# Patient Record
Sex: MALE | Race: WHITE | NOT HISPANIC OR LATINO | Employment: OTHER | URBAN - METROPOLITAN AREA
[De-identification: names, ages, dates, MRNs, and addresses within clinical notes are randomized per-mention and may not be internally consistent; named-entity substitution may affect disease eponyms.]

---

## 2022-03-17 ENCOUNTER — APPOINTMENT (OUTPATIENT)
Dept: RADIOLOGY | Facility: MEDICAL CENTER | Age: 67
DRG: 268 | End: 2022-03-17
Attending: ANESTHESIOLOGY
Payer: MEDICARE

## 2022-03-17 ENCOUNTER — ANESTHESIA (OUTPATIENT)
Dept: SURGERY | Facility: MEDICAL CENTER | Age: 67
DRG: 268 | End: 2022-03-17
Payer: MEDICARE

## 2022-03-17 ENCOUNTER — HOSPITAL ENCOUNTER (INPATIENT)
Facility: MEDICAL CENTER | Age: 67
LOS: 2 days | DRG: 268 | End: 2022-03-19
Attending: EMERGENCY MEDICINE | Admitting: SURGERY
Payer: MEDICARE

## 2022-03-17 ENCOUNTER — HOSPITAL ENCOUNTER (OUTPATIENT)
Dept: RADIOLOGY | Facility: MEDICAL CENTER | Age: 67
End: 2022-03-17

## 2022-03-17 ENCOUNTER — APPOINTMENT (OUTPATIENT)
Dept: RADIOLOGY | Facility: MEDICAL CENTER | Age: 67
DRG: 268 | End: 2022-03-17
Attending: SURGERY
Payer: MEDICARE

## 2022-03-17 ENCOUNTER — ANESTHESIA EVENT (OUTPATIENT)
Dept: SURGERY | Facility: MEDICAL CENTER | Age: 67
DRG: 268 | End: 2022-03-17
Payer: MEDICARE

## 2022-03-17 DIAGNOSIS — D64.89 ANEMIA DUE TO MULTIPLE MECHANISMS: ICD-10-CM

## 2022-03-17 DIAGNOSIS — I71.8 RUPTURED AORTIC ANEURYSM, UNSPECIFIED PORTION OF AORTA (HCC): ICD-10-CM

## 2022-03-17 PROBLEM — I71.30 RUPTURED ABDOMINAL AORTIC ANEURYSM (AAA) (HCC): Status: ACTIVE | Noted: 2022-03-17

## 2022-03-17 LAB
ALBUMIN SERPL BCP-MCNC: 3 G/DL (ref 3.2–4.9)
ALBUMIN/GLOB SERPL: 1.1 G/DL
ALP SERPL-CCNC: 79 U/L (ref 30–99)
ALT SERPL-CCNC: 6 U/L (ref 2–50)
ANION GAP SERPL CALC-SCNC: 11 MMOL/L (ref 7–16)
ANION GAP SERPL CALC-SCNC: 13 MMOL/L (ref 7–16)
AST SERPL-CCNC: 8 U/L (ref 12–45)
BASOPHILS # BLD AUTO: 0.2 % (ref 0–1.8)
BASOPHILS # BLD: 0.04 K/UL (ref 0–0.12)
BILIRUB SERPL-MCNC: 0.7 MG/DL (ref 0.1–1.5)
BUN SERPL-MCNC: 15 MG/DL (ref 8–22)
BUN SERPL-MCNC: 15 MG/DL (ref 8–22)
CALCIUM SERPL-MCNC: 7.5 MG/DL (ref 8.5–10.5)
CALCIUM SERPL-MCNC: 7.9 MG/DL (ref 8.5–10.5)
CHLORIDE SERPL-SCNC: 106 MMOL/L (ref 96–112)
CHLORIDE SERPL-SCNC: 109 MMOL/L (ref 96–112)
CK SERPL-CCNC: 61 U/L (ref 0–154)
CO2 SERPL-SCNC: 19 MMOL/L (ref 20–33)
CO2 SERPL-SCNC: 20 MMOL/L (ref 20–33)
CREAT SERPL-MCNC: 0.95 MG/DL (ref 0.5–1.4)
CREAT SERPL-MCNC: 1.09 MG/DL (ref 0.5–1.4)
EOSINOPHIL # BLD AUTO: 0 K/UL (ref 0–0.51)
EOSINOPHIL NFR BLD: 0 % (ref 0–6.9)
ERYTHROCYTE [DISTWIDTH] IN BLOOD BY AUTOMATED COUNT: 53.3 FL (ref 35.9–50)
GFR SERPLBLD CREATININE-BSD FMLA CKD-EPI: 74 ML/MIN/1.73 M 2
GFR SERPLBLD CREATININE-BSD FMLA CKD-EPI: 87 ML/MIN/1.73 M 2
GLOBULIN SER CALC-MCNC: 2.8 G/DL (ref 1.9–3.5)
GLUCOSE SERPL-MCNC: 114 MG/DL (ref 65–99)
GLUCOSE SERPL-MCNC: 138 MG/DL (ref 65–99)
HCT VFR BLD AUTO: 41 % (ref 42–52)
HGB BLD-MCNC: 13.5 G/DL (ref 14–18)
IMM GRANULOCYTES # BLD AUTO: 0.15 K/UL (ref 0–0.11)
IMM GRANULOCYTES NFR BLD AUTO: 0.8 % (ref 0–0.9)
INR PPP: 1.09 (ref 0.87–1.13)
LACTATE BLD-SCNC: 0.9 MMOL/L (ref 0.5–2)
LACTATE BLD-SCNC: 2.4 MMOL/L (ref 0.5–2)
LYMPHOCYTES # BLD AUTO: 0.97 K/UL (ref 1–4.8)
LYMPHOCYTES NFR BLD: 5.4 % (ref 22–41)
MAGNESIUM SERPL-MCNC: 3.2 MG/DL (ref 1.5–2.5)
MCH RBC QN AUTO: 34.3 PG (ref 27–33)
MCHC RBC AUTO-ENTMCNC: 32.9 G/DL (ref 33.7–35.3)
MCV RBC AUTO: 104.1 FL (ref 81.4–97.8)
MONOCYTES # BLD AUTO: 1.69 K/UL (ref 0–0.85)
MONOCYTES NFR BLD AUTO: 9.5 % (ref 0–13.4)
NEUTROPHILS # BLD AUTO: 15.01 K/UL (ref 1.82–7.42)
NEUTROPHILS NFR BLD: 84.1 % (ref 44–72)
NRBC # BLD AUTO: 0 K/UL
NRBC BLD-RTO: 0 /100 WBC
PHOSPHATE SERPL-MCNC: 3.8 MG/DL (ref 2.5–4.5)
PLATELET # BLD AUTO: 344 K/UL (ref 164–446)
PMV BLD AUTO: 8.8 FL (ref 9–12.9)
POTASSIUM SERPL-SCNC: 4.7 MMOL/L (ref 3.6–5.5)
POTASSIUM SERPL-SCNC: 6.2 MMOL/L (ref 3.6–5.5)
PROT SERPL-MCNC: 5.8 G/DL (ref 6–8.2)
PROTHROMBIN TIME: 13.8 SEC (ref 12–14.6)
RBC # BLD AUTO: 3.94 M/UL (ref 4.7–6.1)
SARS-COV+SARS-COV-2 AG RESP QL IA.RAPID: NOTDETECTED
SODIUM SERPL-SCNC: 137 MMOL/L (ref 135–145)
SODIUM SERPL-SCNC: 141 MMOL/L (ref 135–145)
SPECIMEN SOURCE: NORMAL
WBC # BLD AUTO: 17.9 K/UL (ref 4.8–10.8)

## 2022-03-17 PROCEDURE — 700117 HCHG RX CONTRAST REV CODE 255: Performed by: SURGERY

## 2022-03-17 PROCEDURE — 700105 HCHG RX REV CODE 258: Performed by: ANESTHESIOLOGY

## 2022-03-17 PROCEDURE — 500258 HCHG CATH, SIZING OMNI 5F 70CM: Performed by: SURGERY

## 2022-03-17 PROCEDURE — C1769 GUIDE WIRE: HCPCS | Performed by: SURGERY

## 2022-03-17 PROCEDURE — 82550 ASSAY OF CK (CPK): CPT

## 2022-03-17 PROCEDURE — 85610 PROTHROMBIN TIME: CPT

## 2022-03-17 PROCEDURE — 04V03DZ RESTRICTION OF ABDOMINAL AORTA WITH INTRALUMINAL DEVICE, PERCUTANEOUS APPROACH: ICD-10-PCS | Performed by: SURGERY

## 2022-03-17 PROCEDURE — 160042 HCHG SURGERY MINUTES - EA ADDL 1 MIN LEVEL 5: Performed by: SURGERY

## 2022-03-17 PROCEDURE — 700102 HCHG RX REV CODE 250 W/ 637 OVERRIDE(OP): Performed by: SURGERY

## 2022-03-17 PROCEDURE — A9270 NON-COVERED ITEM OR SERVICE: HCPCS | Performed by: ANESTHESIOLOGY

## 2022-03-17 PROCEDURE — 93005 ELECTROCARDIOGRAM TRACING: CPT | Performed by: SURGERY

## 2022-03-17 PROCEDURE — 80053 COMPREHEN METABOLIC PANEL: CPT

## 2022-03-17 PROCEDURE — A9270 NON-COVERED ITEM OR SERVICE: HCPCS | Performed by: SURGERY

## 2022-03-17 PROCEDURE — 700101 HCHG RX REV CODE 250: Performed by: ANESTHESIOLOGY

## 2022-03-17 PROCEDURE — 160048 HCHG OR STATISTICAL LEVEL 1-5: Performed by: SURGERY

## 2022-03-17 PROCEDURE — C1760 CLOSURE DEV, VASC: HCPCS | Performed by: SURGERY

## 2022-03-17 PROCEDURE — 160031 HCHG SURGERY MINUTES - 1ST 30 MINS LEVEL 5: Performed by: SURGERY

## 2022-03-17 PROCEDURE — 700101 HCHG RX REV CODE 250: Performed by: SURGERY

## 2022-03-17 PROCEDURE — 700111 HCHG RX REV CODE 636 W/ 250 OVERRIDE (IP): Performed by: SURGERY

## 2022-03-17 PROCEDURE — 110454 HCHG SHELL REV 250: Performed by: SURGERY

## 2022-03-17 PROCEDURE — 83605 ASSAY OF LACTIC ACID: CPT

## 2022-03-17 PROCEDURE — 502240 HCHG MISC OR SUPPLY RC 0272: Performed by: SURGERY

## 2022-03-17 PROCEDURE — 160035 HCHG PACU - 1ST 60 MINS PHASE I: Performed by: SURGERY

## 2022-03-17 PROCEDURE — C1894 INTRO/SHEATH, NON-LASER: HCPCS | Performed by: SURGERY

## 2022-03-17 PROCEDURE — 87426 SARSCOV CORONAVIRUS AG IA: CPT

## 2022-03-17 PROCEDURE — 71045 X-RAY EXAM CHEST 1 VIEW: CPT

## 2022-03-17 PROCEDURE — 700105 HCHG RX REV CODE 258: Performed by: SURGERY

## 2022-03-17 PROCEDURE — C1768 GRAFT, VASCULAR: HCPCS | Performed by: SURGERY

## 2022-03-17 PROCEDURE — 34702 EVASC RPR A-AO NDGFT RPT: CPT | Performed by: SURGERY

## 2022-03-17 PROCEDURE — 160036 HCHG PACU - EA ADDL 30 MINS PHASE I: Performed by: SURGERY

## 2022-03-17 PROCEDURE — 500229 HCHG CATH, GLIDE STR TAPER 5FR 100: Performed by: SURGERY

## 2022-03-17 PROCEDURE — 99291 CRITICAL CARE FIRST HOUR: CPT

## 2022-03-17 PROCEDURE — 700111 HCHG RX REV CODE 636 W/ 250 OVERRIDE (IP): Performed by: ANESTHESIOLOGY

## 2022-03-17 PROCEDURE — 110372 HCHG SHELL REV 278: Performed by: SURGERY

## 2022-03-17 PROCEDURE — 80048 BASIC METABOLIC PNL TOTAL CA: CPT

## 2022-03-17 PROCEDURE — 84100 ASSAY OF PHOSPHORUS: CPT

## 2022-03-17 PROCEDURE — 75625 CONTRAST EXAM ABDOMINL AORTA: CPT | Mod: 26,59 | Performed by: SURGERY

## 2022-03-17 PROCEDURE — 83735 ASSAY OF MAGNESIUM: CPT

## 2022-03-17 PROCEDURE — 85025 COMPLETE CBC W/AUTO DIFF WBC: CPT

## 2022-03-17 PROCEDURE — 36247 INS CATH ABD/L-EXT ART 3RD: CPT | Mod: 50 | Performed by: SURGERY

## 2022-03-17 PROCEDURE — 500257: Performed by: SURGERY

## 2022-03-17 PROCEDURE — 770022 HCHG ROOM/CARE - ICU (200)

## 2022-03-17 PROCEDURE — 700102 HCHG RX REV CODE 250 W/ 637 OVERRIDE(OP): Performed by: ANESTHESIOLOGY

## 2022-03-17 PROCEDURE — 501837 HCHG SUTURE CV: Performed by: SURGERY

## 2022-03-17 PROCEDURE — 36415 COLL VENOUS BLD VENIPUNCTURE: CPT

## 2022-03-17 PROCEDURE — 99221 1ST HOSP IP/OBS SF/LOW 40: CPT | Mod: 25 | Performed by: SURGERY

## 2022-03-17 PROCEDURE — 501838 HCHG SUTURE GENERAL: Performed by: SURGERY

## 2022-03-17 PROCEDURE — C1725 CATH, TRANSLUMIN NON-LASER: HCPCS | Performed by: SURGERY

## 2022-03-17 PROCEDURE — 160002 HCHG RECOVERY MINUTES (STAT): Performed by: SURGERY

## 2022-03-17 PROCEDURE — 160009 HCHG ANES TIME/MIN: Performed by: SURGERY

## 2022-03-17 DEVICE — ENDOPROSTHESIS CONTRALATERAL LEG 14.5MM X 10CM: Type: IMPLANTABLE DEVICE | Site: GROIN | Status: FUNCTIONAL

## 2022-03-17 DEVICE — ENDOPROSTHESIS CONTRALATERAL LEG 14.5MM X 12CM: Type: IMPLANTABLE DEVICE | Site: GROIN | Status: FUNCTIONAL

## 2022-03-17 DEVICE — IMPLANTABLE DEVICE: Type: IMPLANTABLE DEVICE | Site: GROIN | Status: FUNCTIONAL

## 2022-03-17 RX ORDER — ACETAMINOPHEN 325 MG/1
650 TABLET ORAL 4 TIMES DAILY
Status: DISCONTINUED | OUTPATIENT
Start: 2022-03-17 | End: 2022-03-17

## 2022-03-17 RX ORDER — HYDROMORPHONE HYDROCHLORIDE 1 MG/ML
0.2 INJECTION, SOLUTION INTRAMUSCULAR; INTRAVENOUS; SUBCUTANEOUS
Status: DISCONTINUED | OUTPATIENT
Start: 2022-03-17 | End: 2022-03-17 | Stop reason: HOSPADM

## 2022-03-17 RX ORDER — KETOROLAC TROMETHAMINE 30 MG/ML
15 INJECTION, SOLUTION INTRAMUSCULAR; INTRAVENOUS EVERY 6 HOURS
Status: DISCONTINUED | OUTPATIENT
Start: 2022-03-17 | End: 2022-03-19

## 2022-03-17 RX ORDER — DEXAMETHASONE SODIUM PHOSPHATE 4 MG/ML
INJECTION, SOLUTION INTRA-ARTICULAR; INTRALESIONAL; INTRAMUSCULAR; INTRAVENOUS; SOFT TISSUE PRN
Status: DISCONTINUED | OUTPATIENT
Start: 2022-03-17 | End: 2022-03-17 | Stop reason: SURG

## 2022-03-17 RX ORDER — DIPHENHYDRAMINE HYDROCHLORIDE 50 MG/ML
12.5 INJECTION INTRAMUSCULAR; INTRAVENOUS
Status: DISCONTINUED | OUTPATIENT
Start: 2022-03-17 | End: 2022-03-17 | Stop reason: HOSPADM

## 2022-03-17 RX ORDER — POLYETHYLENE GLYCOL 3350 17 G/17G
1 POWDER, FOR SOLUTION ORAL 2 TIMES DAILY
Status: DISCONTINUED | OUTPATIENT
Start: 2022-03-18 | End: 2022-03-19 | Stop reason: HOSPADM

## 2022-03-17 RX ORDER — AMOXICILLIN 250 MG
1 CAPSULE ORAL
Status: DISCONTINUED | OUTPATIENT
Start: 2022-03-17 | End: 2022-03-19 | Stop reason: HOSPADM

## 2022-03-17 RX ORDER — NEOSTIGMINE METHYLSULFATE 1 MG/ML
INJECTION, SOLUTION INTRAVENOUS PRN
Status: DISCONTINUED | OUTPATIENT
Start: 2022-03-17 | End: 2022-03-17 | Stop reason: SURG

## 2022-03-17 RX ORDER — ACETAMINOPHEN 500 MG
1000 TABLET ORAL EVERY 4 HOURS PRN
Status: DISCONTINUED | OUTPATIENT
Start: 2022-03-17 | End: 2022-03-17 | Stop reason: HOSPADM

## 2022-03-17 RX ORDER — SODIUM CHLORIDE 9 MG/ML
INJECTION, SOLUTION INTRAVENOUS CONTINUOUS
Status: DISCONTINUED | OUTPATIENT
Start: 2022-03-17 | End: 2022-03-19

## 2022-03-17 RX ORDER — ONDANSETRON 2 MG/ML
4 INJECTION INTRAMUSCULAR; INTRAVENOUS EVERY 4 HOURS PRN
Status: DISCONTINUED | OUTPATIENT
Start: 2022-03-17 | End: 2022-03-17

## 2022-03-17 RX ORDER — HALOPERIDOL 5 MG/ML
1 INJECTION INTRAMUSCULAR EVERY 6 HOURS PRN
Status: DISCONTINUED | OUTPATIENT
Start: 2022-03-17 | End: 2022-03-19 | Stop reason: HOSPADM

## 2022-03-17 RX ORDER — ONDANSETRON 2 MG/ML
4 INJECTION INTRAMUSCULAR; INTRAVENOUS
Status: DISCONTINUED | OUTPATIENT
Start: 2022-03-17 | End: 2022-03-17 | Stop reason: HOSPADM

## 2022-03-17 RX ORDER — SODIUM CHLORIDE, SODIUM LACTATE, POTASSIUM CHLORIDE, CALCIUM CHLORIDE 600; 310; 30; 20 MG/100ML; MG/100ML; MG/100ML; MG/100ML
INJECTION, SOLUTION INTRAVENOUS
Status: DISCONTINUED | OUTPATIENT
Start: 2022-03-17 | End: 2022-03-17 | Stop reason: SURG

## 2022-03-17 RX ORDER — OXYCODONE HYDROCHLORIDE 5 MG/1
10 TABLET ORAL EVERY 4 HOURS PRN
Status: DISCONTINUED | OUTPATIENT
Start: 2022-03-17 | End: 2022-03-17

## 2022-03-17 RX ORDER — OXYCODONE HYDROCHLORIDE 5 MG/1
5 TABLET ORAL
Status: DISCONTINUED | OUTPATIENT
Start: 2022-03-17 | End: 2022-03-19 | Stop reason: HOSPADM

## 2022-03-17 RX ORDER — IBUPROFEN 800 MG/1
800 TABLET ORAL 3 TIMES DAILY PRN
Status: DISCONTINUED | OUTPATIENT
Start: 2022-03-20 | End: 2022-03-19

## 2022-03-17 RX ORDER — OXYCODONE HYDROCHLORIDE 5 MG/1
5 TABLET ORAL EVERY 4 HOURS PRN
Status: DISCONTINUED | OUTPATIENT
Start: 2022-03-17 | End: 2022-03-17

## 2022-03-17 RX ORDER — ACETAMINOPHEN 325 MG/1
650 TABLET ORAL EVERY 6 HOURS PRN
Status: DISCONTINUED | OUTPATIENT
Start: 2022-03-22 | End: 2022-03-19 | Stop reason: HOSPADM

## 2022-03-17 RX ORDER — ONDANSETRON 2 MG/ML
INJECTION INTRAMUSCULAR; INTRAVENOUS PRN
Status: DISCONTINUED | OUTPATIENT
Start: 2022-03-17 | End: 2022-03-17 | Stop reason: SURG

## 2022-03-17 RX ORDER — IODIXANOL 270 MG/ML
INJECTION, SOLUTION INTRAVASCULAR
Status: DISCONTINUED | OUTPATIENT
Start: 2022-03-17 | End: 2022-03-17 | Stop reason: HOSPADM

## 2022-03-17 RX ORDER — HYDROMORPHONE HYDROCHLORIDE 1 MG/ML
0.1 INJECTION, SOLUTION INTRAMUSCULAR; INTRAVENOUS; SUBCUTANEOUS
Status: DISCONTINUED | OUTPATIENT
Start: 2022-03-17 | End: 2022-03-17 | Stop reason: HOSPADM

## 2022-03-17 RX ORDER — HYDRALAZINE HYDROCHLORIDE 20 MG/ML
10 INJECTION INTRAMUSCULAR; INTRAVENOUS
Status: DISCONTINUED | OUTPATIENT
Start: 2022-03-17 | End: 2022-03-19 | Stop reason: HOSPADM

## 2022-03-17 RX ORDER — DEXAMETHASONE SODIUM PHOSPHATE 4 MG/ML
4 INJECTION, SOLUTION INTRA-ARTICULAR; INTRALESIONAL; INTRAMUSCULAR; INTRAVENOUS; SOFT TISSUE
Status: DISCONTINUED | OUTPATIENT
Start: 2022-03-17 | End: 2022-03-19 | Stop reason: HOSPADM

## 2022-03-17 RX ORDER — OXYCODONE HCL 5 MG/5 ML
10 SOLUTION, ORAL ORAL
Status: DISCONTINUED | OUTPATIENT
Start: 2022-03-17 | End: 2022-03-17 | Stop reason: HOSPADM

## 2022-03-17 RX ORDER — MEPERIDINE HYDROCHLORIDE 25 MG/ML
12.5 INJECTION INTRAMUSCULAR; INTRAVENOUS; SUBCUTANEOUS
Status: DISCONTINUED | OUTPATIENT
Start: 2022-03-17 | End: 2022-03-17 | Stop reason: HOSPADM

## 2022-03-17 RX ORDER — GLYCOPYRROLATE 0.2 MG/ML
INJECTION INTRAMUSCULAR; INTRAVENOUS PRN
Status: DISCONTINUED | OUTPATIENT
Start: 2022-03-17 | End: 2022-03-17 | Stop reason: SURG

## 2022-03-17 RX ORDER — ROCURONIUM BROMIDE 10 MG/ML
INJECTION, SOLUTION INTRAVENOUS PRN
Status: DISCONTINUED | OUTPATIENT
Start: 2022-03-17 | End: 2022-03-17 | Stop reason: SURG

## 2022-03-17 RX ORDER — LABETALOL HYDROCHLORIDE 5 MG/ML
10 INJECTION, SOLUTION INTRAVENOUS
Status: DISCONTINUED | OUTPATIENT
Start: 2022-03-17 | End: 2022-03-19 | Stop reason: HOSPADM

## 2022-03-17 RX ORDER — HEPARIN SODIUM,PORCINE 1000/ML
VIAL (ML) INJECTION PRN
Status: DISCONTINUED | OUTPATIENT
Start: 2022-03-17 | End: 2022-03-17 | Stop reason: SURG

## 2022-03-17 RX ORDER — HALOPERIDOL 5 MG/ML
1 INJECTION INTRAMUSCULAR
Status: DISCONTINUED | OUTPATIENT
Start: 2022-03-17 | End: 2022-03-17 | Stop reason: HOSPADM

## 2022-03-17 RX ORDER — BISACODYL 10 MG
10 SUPPOSITORY, RECTAL RECTAL
Status: DISCONTINUED | OUTPATIENT
Start: 2022-03-17 | End: 2022-03-19 | Stop reason: HOSPADM

## 2022-03-17 RX ORDER — SCOLOPAMINE TRANSDERMAL SYSTEM 1 MG/1
1 PATCH, EXTENDED RELEASE TRANSDERMAL
Status: DISCONTINUED | OUTPATIENT
Start: 2022-03-17 | End: 2022-03-19 | Stop reason: HOSPADM

## 2022-03-17 RX ORDER — ACETAMINOPHEN 325 MG/1
650 TABLET ORAL EVERY 6 HOURS
Status: DISCONTINUED | OUTPATIENT
Start: 2022-03-17 | End: 2022-03-19 | Stop reason: HOSPADM

## 2022-03-17 RX ORDER — SODIUM CHLORIDE, SODIUM LACTATE, POTASSIUM CHLORIDE, CALCIUM CHLORIDE 600; 310; 30; 20 MG/100ML; MG/100ML; MG/100ML; MG/100ML
INJECTION, SOLUTION INTRAVENOUS CONTINUOUS
Status: DISCONTINUED | OUTPATIENT
Start: 2022-03-17 | End: 2022-03-17 | Stop reason: HOSPADM

## 2022-03-17 RX ORDER — MAGNESIUM SULFATE HEPTAHYDRATE 40 MG/ML
INJECTION, SOLUTION INTRAVENOUS PRN
Status: DISCONTINUED | OUTPATIENT
Start: 2022-03-17 | End: 2022-03-17 | Stop reason: SURG

## 2022-03-17 RX ORDER — AMOXICILLIN 250 MG
1 CAPSULE ORAL NIGHTLY
Status: DISCONTINUED | OUTPATIENT
Start: 2022-03-17 | End: 2022-03-19 | Stop reason: HOSPADM

## 2022-03-17 RX ORDER — DOCUSATE SODIUM 100 MG/1
100 CAPSULE, LIQUID FILLED ORAL 2 TIMES DAILY
Status: DISCONTINUED | OUTPATIENT
Start: 2022-03-17 | End: 2022-03-19 | Stop reason: HOSPADM

## 2022-03-17 RX ORDER — DIPHENHYDRAMINE HYDROCHLORIDE 50 MG/ML
25 INJECTION INTRAMUSCULAR; INTRAVENOUS EVERY 6 HOURS PRN
Status: DISCONTINUED | OUTPATIENT
Start: 2022-03-17 | End: 2022-03-19 | Stop reason: HOSPADM

## 2022-03-17 RX ORDER — DEXTROSE MONOHYDRATE, SODIUM CHLORIDE, AND POTASSIUM CHLORIDE 50; 1.49; 4.5 G/1000ML; G/1000ML; G/1000ML
INJECTION, SOLUTION INTRAVENOUS CONTINUOUS
Status: ACTIVE | OUTPATIENT
Start: 2022-03-17 | End: 2022-03-17

## 2022-03-17 RX ORDER — PHENYLEPHRINE HCL IN 0.9% NACL 0.5 MG/5ML
SYRINGE (ML) INTRAVENOUS PRN
Status: DISCONTINUED | OUTPATIENT
Start: 2022-03-17 | End: 2022-03-17 | Stop reason: SURG

## 2022-03-17 RX ORDER — ONDANSETRON 2 MG/ML
4 INJECTION INTRAMUSCULAR; INTRAVENOUS EVERY 4 HOURS PRN
Status: DISCONTINUED | OUTPATIENT
Start: 2022-03-17 | End: 2022-03-19 | Stop reason: HOSPADM

## 2022-03-17 RX ORDER — ENEMA 19; 7 G/133ML; G/133ML
1 ENEMA RECTAL
Status: DISCONTINUED | OUTPATIENT
Start: 2022-03-17 | End: 2022-03-19 | Stop reason: HOSPADM

## 2022-03-17 RX ORDER — MIDAZOLAM HYDROCHLORIDE 1 MG/ML
1 INJECTION INTRAMUSCULAR; INTRAVENOUS
Status: DISCONTINUED | OUTPATIENT
Start: 2022-03-17 | End: 2022-03-17 | Stop reason: HOSPADM

## 2022-03-17 RX ORDER — HYDROMORPHONE HYDROCHLORIDE 1 MG/ML
0.4 INJECTION, SOLUTION INTRAMUSCULAR; INTRAVENOUS; SUBCUTANEOUS
Status: DISCONTINUED | OUTPATIENT
Start: 2022-03-17 | End: 2022-03-17 | Stop reason: HOSPADM

## 2022-03-17 RX ORDER — HEPARIN SODIUM 5000 [USP'U]/ML
5000 INJECTION, SOLUTION INTRAVENOUS; SUBCUTANEOUS EVERY 8 HOURS
Status: DISCONTINUED | OUTPATIENT
Start: 2022-03-17 | End: 2022-03-17

## 2022-03-17 RX ORDER — CEFAZOLIN SODIUM 1 G/3ML
INJECTION, POWDER, FOR SOLUTION INTRAMUSCULAR; INTRAVENOUS PRN
Status: DISCONTINUED | OUTPATIENT
Start: 2022-03-17 | End: 2022-03-17 | Stop reason: SURG

## 2022-03-17 RX ORDER — NICOTINE 21 MG/24HR
21 PATCH, TRANSDERMAL 24 HOURS TRANSDERMAL
Status: DISCONTINUED | OUTPATIENT
Start: 2022-03-17 | End: 2022-03-19 | Stop reason: HOSPADM

## 2022-03-17 RX ORDER — PROTAMINE SULFATE 10 MG/ML
INJECTION, SOLUTION INTRAVENOUS PRN
Status: DISCONTINUED | OUTPATIENT
Start: 2022-03-17 | End: 2022-03-17 | Stop reason: SURG

## 2022-03-17 RX ORDER — OXYCODONE HCL 5 MG/5 ML
5 SOLUTION, ORAL ORAL
Status: DISCONTINUED | OUTPATIENT
Start: 2022-03-17 | End: 2022-03-17 | Stop reason: HOSPADM

## 2022-03-17 RX ORDER — ONDANSETRON 4 MG/1
4 TABLET, ORALLY DISINTEGRATING ORAL EVERY 4 HOURS PRN
Status: DISCONTINUED | OUTPATIENT
Start: 2022-03-17 | End: 2022-03-19 | Stop reason: HOSPADM

## 2022-03-17 RX ORDER — OXYCODONE HYDROCHLORIDE 10 MG/1
10 TABLET ORAL
Status: DISCONTINUED | OUTPATIENT
Start: 2022-03-17 | End: 2022-03-19 | Stop reason: HOSPADM

## 2022-03-17 RX ADMIN — Medication 200 MCG: at 13:10

## 2022-03-17 RX ADMIN — MAGNESIUM SULFATE HEPTAHYDRATE 4 G: 40 INJECTION, SOLUTION INTRAVENOUS at 13:23

## 2022-03-17 RX ADMIN — Medication 200 MCG: at 13:27

## 2022-03-17 RX ADMIN — PROTAMINE SULFATE 50 MG: 10 INJECTION, SOLUTION INTRAVENOUS at 14:07

## 2022-03-17 RX ADMIN — Medication 200 MCG: at 12:57

## 2022-03-17 RX ADMIN — NICOTINE TRANSDERMAL SYSTEM 21 MG: 21 PATCH, EXTENDED RELEASE TRANSDERMAL at 17:26

## 2022-03-17 RX ADMIN — FENTANYL CITRATE 500 MCG: 50 INJECTION, SOLUTION INTRAMUSCULAR; INTRAVENOUS at 12:35

## 2022-03-17 RX ADMIN — HEPARIN SODIUM 6000 UNITS: 1000 INJECTION, SOLUTION INTRAVENOUS; SUBCUTANEOUS at 13:11

## 2022-03-17 RX ADMIN — DEXAMETHASONE SODIUM PHOSPHATE 4 MG: 4 INJECTION, SOLUTION INTRA-ARTICULAR; INTRALESIONAL; INTRAMUSCULAR; INTRAVENOUS; SOFT TISSUE at 13:13

## 2022-03-17 RX ADMIN — ROCURONIUM BROMIDE 50 MG: 10 INJECTION, SOLUTION INTRAVENOUS at 12:35

## 2022-03-17 RX ADMIN — ACETAMINOPHEN 650 MG: 325 TABLET, FILM COATED ORAL at 23:42

## 2022-03-17 RX ADMIN — HEPARIN SODIUM 2000 UNITS: 1000 INJECTION, SOLUTION INTRAVENOUS; SUBCUTANEOUS at 13:30

## 2022-03-17 RX ADMIN — NEOSTIGMINE METHYLSULFATE 5 MG: 1 INJECTION INTRAVENOUS at 14:05

## 2022-03-17 RX ADMIN — PROPOFOL 50 MG: 10 INJECTION, EMULSION INTRAVENOUS at 12:37

## 2022-03-17 RX ADMIN — KETOROLAC TROMETHAMINE 15 MG: 30 INJECTION, SOLUTION INTRAMUSCULAR; INTRAVENOUS at 17:25

## 2022-03-17 RX ADMIN — SODIUM CHLORIDE: 9 INJECTION, SOLUTION INTRAVENOUS at 17:26

## 2022-03-17 RX ADMIN — GLYCOPYRROLATE 1 MG: 0.2 INJECTION INTRAMUSCULAR; INTRAVENOUS at 14:05

## 2022-03-17 RX ADMIN — KETOROLAC TROMETHAMINE 15 MG: 30 INJECTION, SOLUTION INTRAMUSCULAR; INTRAVENOUS at 23:42

## 2022-03-17 RX ADMIN — SODIUM CHLORIDE, POTASSIUM CHLORIDE, SODIUM LACTATE AND CALCIUM CHLORIDE: 600; 310; 30; 20 INJECTION, SOLUTION INTRAVENOUS at 12:30

## 2022-03-17 RX ADMIN — CEFAZOLIN 2 G: 330 INJECTION, POWDER, FOR SOLUTION INTRAMUSCULAR; INTRAVENOUS at 12:50

## 2022-03-17 RX ADMIN — ONDANSETRON 4 MG: 2 INJECTION INTRAMUSCULAR; INTRAVENOUS at 13:13

## 2022-03-17 RX ADMIN — ACETAMINOPHEN 650 MG: 325 TABLET, FILM COATED ORAL at 17:25

## 2022-03-17 RX ADMIN — SUGAMMADEX 200 MG: 100 INJECTION, SOLUTION INTRAVENOUS at 14:21

## 2022-03-17 RX ADMIN — PROPOFOL 50 MG: 10 INJECTION, EMULSION INTRAVENOUS at 12:35

## 2022-03-17 RX ADMIN — DOCUSATE SODIUM 100 MG: 100 CAPSULE, LIQUID FILLED ORAL at 17:25

## 2022-03-17 ASSESSMENT — LIFESTYLE VARIABLES
AVERAGE NUMBER OF DAYS PER WEEK YOU HAVE A DRINK CONTAINING ALCOHOL: 7
EVER HAD A DRINK FIRST THING IN THE MORNING TO STEADY YOUR NERVES TO GET RID OF A HANGOVER: YES
HAVE YOU EVER FELT YOU SHOULD CUT DOWN ON YOUR DRINKING: YES
TOTAL SCORE: 4
HOW MANY TIMES IN THE PAST YEAR HAVE YOU HAD 5 OR MORE DRINKS IN A DAY: 365
TOTAL SCORE: 4
HAVE PEOPLE ANNOYED YOU BY CRITICIZING YOUR DRINKING: YES
DOES PATIENT WANT TO STOP DRINKING: NO
ON A TYPICAL DAY WHEN YOU DRINK ALCOHOL HOW MANY DRINKS DO YOU HAVE: 10
ALCOHOL_USE: YES
EVER FELT BAD OR GUILTY ABOUT YOUR DRINKING: YES
TOTAL SCORE: 4
CONSUMPTION TOTAL: POSITIVE

## 2022-03-17 ASSESSMENT — COGNITIVE AND FUNCTIONAL STATUS - GENERAL
MOBILITY SCORE: 24
SUGGESTED CMS G CODE MODIFIER MOBILITY: CH
SUGGESTED CMS G CODE MODIFIER DAILY ACTIVITY: CH
DAILY ACTIVITIY SCORE: 24

## 2022-03-17 ASSESSMENT — PATIENT HEALTH QUESTIONNAIRE - PHQ9
SUM OF ALL RESPONSES TO PHQ9 QUESTIONS 1 AND 2: 0
2. FEELING DOWN, DEPRESSED, IRRITABLE, OR HOPELESS: NOT AT ALL
1. LITTLE INTEREST OR PLEASURE IN DOING THINGS: NOT AT ALL

## 2022-03-17 ASSESSMENT — FIBROSIS 4 INDEX: FIB4 SCORE: 0.65

## 2022-03-17 ASSESSMENT — PAIN DESCRIPTION - PAIN TYPE: TYPE: ACUTE PAIN;SURGICAL PAIN

## 2022-03-17 ASSESSMENT — PAIN SCALES - GENERAL: PAIN_LEVEL: 0

## 2022-03-17 NOTE — OR NURSING
Resting comfortably in PACU.  Oriented times 4.  MAEW.HOB elevated  10 degrees.  Both groins clean and dry, no bleeding, no hematoma.  Toes warm and mobile with brisk cap refill.  PT pulses both feet by Doppler, Right DP by Doppler, Left DP no found by Doppler,  Erin Sheets aware.,  Patient denies pain and nausea.  PCXR done to confirm line placement and to rule out pneumothorax.  RIJ Triple lumen in place.  Left radial arterial line recalibrated, compares w cuff.   Left hand warm and mobile, nailbeds pink with brisk cap refill. Dr. Sanders here to talk to patient.  Stat labs drawn from A-line and sent to lab.  Report to SICU.

## 2022-03-17 NOTE — OP REPORT
ACUTE CARE VASCULAR SERVICE  OPERATIVE NOTE          Patient: Ray Myers  MRN:  [id number]  :  1955    Date:  3/17/2022     Preoperative Diagnosis:  - ruptured 7 cm infrarenal AAA with contained retroperitoneal hematoma     Postoperative Diagnosis:  - ruptured 7 cm infrarenal AAA with contained retroperitoneal hematoma      Procedure:  - Endovascular repair of ruptured infrarenal abdominal aortic aneurysm with [place] Excluder endograft with 2 docking limbs.  - Nonselective catheter placement in the aorta  - Aortoiliac angiogram  -Percutaneous access of the bilateral common femoral arteries with ultrasound guidance  -Bilateral femoral artery closure with pre-close technique using perclose devices (2 devices on the right, 1 on the left)     -------------------------------------------------------------------------------------------------     Surgeon:                                 Andrew Manley MD     Assistant:                                none     Anesthesia:                             GETA     IVF:                                          Per anesthesia record     UOP:                                       Samara kwan     EBL:                                        Minimal     Blood Products:                      none     Heparin:                                  Systemically heparinized       Devices used:                         Main trunk -35 mm (ipsilateral: right)                                                  Ipsilateral iliac - 16mm x 14.5 mm x 12cm iliac limb                                                   Contralateral iliac - 16mm x 14.5 mm x 10cm iliac limb         Complications:                        none     Disposition:                             Extubated and sent to PACU in stable condition        -----------------------------------------------------------------------------------------------------     History:  66 y.o. male with a ruputred infrarenal abdominal aortic aneurysm.  Endovascular repair was recommended.  I explained the details of the operation and potential risks, including but not limited to bleeding, infection, injury to vessels or nerves, possible open incision, use of xray and contrast exposure, risks of anesthesia, and global risks such as stroke, heart attack, and potentially not surviving the operation or recovery.  All questions were answered.     Procedure Summary:  Patient was placed supine on the OR table and GETA was administered. Abdomen and groins were prepped and draped in the usual fashion. Timeout was called to identify the correct patient, procedure, and equipment, and everyone was in agreement. Patient did receive preoperative prophylactic antibiotics.     The femoral arteries were accessed percutaneously and dilated up to 6 [place] Islander sheaths. We deployed 2 Perclose ProStyle devices in the right femoral artery and one in the left femoral artery and then we inserted a 18 [place] Taylor dry seal sheath in the right femoral artery and a 12 [place] Taylor dry seal sheath in the left common femoral artery. Patient was systemically heparinized.     The main trunk was preselected based on CT scan measurements and it was advanced up the ipsilateral side and an Omni Flush catheter was advanced up the contralateral side to the level of the renal arteries. Contrast angiography was performed to identify the renal origins and the graft was deployed just below.  The graft opened in perfect position relative to the renal arteries and post-deployment angiography revealed patent renal orifices.      The gate was deployed and cannulated with a glidewire and an Omni Flush catheter was passed up to the main trunk and formed and spun to verify that we had good confirmatory gate cannulation. We placed a Lunderquist wire up the contralateral side and retrograde angiography was performed to identify the hypogastric and an iliac limb was passed and deployed, landing perfectly just above the  hypogastric.      Deployment of the trunk was completed. Retrograde angiography was performed to identify the ipsilateral hypogastric.  Additional seal zone in the common iliac artery was necessary.  We passed an iliac limb and and deployed it in the common iliac landing just above the hypogastric.     Reliant balloons were passed up both sides and inflated at the proximal and distal seal zones and at overlap junctions.  Omniflush was advanced into the visceral aorta and completion angiogram revealed patent limbs with no evidence of endoleak.    The wires and sheaths were removed and the arteriotomies were closed with the Perclose sutures.  Feet remained well perfused.  The puncture sites were closed with skin glue.     All counts were correct. Patient was extubated and sent to PACU in stable condition.     Andrew Manley MD  [facility]  [phone]

## 2022-03-17 NOTE — ANESTHESIA PREPROCEDURE EVALUATION
Case: [id number] Date/Time: 03/17/22 1204    Procedure: EXCISION OR REPAIR, AAA OPEN    Location: [facility] OR 05 / SURGERY [facility]    Surgeons: Andrew Manley M.D.          Relevant Problems   No relevant active problems       Physical Exam    Airway   Mallampati: II  TM distance: >3 FB  Neck ROM: full       Cardiovascular - normal exam  Rhythm: regular  Rate: normal  (-) murmur     Dental - normal exam           Pulmonary - normal exam  Breath sounds clear to auscultation     Abdominal    Neurological - normal exam                 Anesthesia Plan    ASA 3- EMERGENT       Plan - general       Airway plan will be ETT          Induction: intravenous    Postoperative Plan: Postoperative administration of opioids is intended.    Pertinent diagnostic labs and testing reviewed    Informed Consent:    Anesthetic plan and risks discussed with patient.    Use of blood products discussed with: patient whom consented to blood products.       aaa; pamela cvp

## 2022-03-17 NOTE — ED PROVIDER NOTES
"ED Provider Note    ER PROVIDER NOTE        CHIEF COMPLAINT  Chief Complaint   Patient presents with   • Abdominal Aortic Aneurysm       HPI  Ray Myers is a 66 y.o. male who presents to the emergency department complaining of back pain as well as abdominal pain.  Patient reports gradual onset of his pain 3 days ago, and has been constant since.  Seems to be worse with movement but no known other alleviating or aggravating factors.  He reports no injury to the area or abnormal twisting or bending.  He reports no nausea or vomiting or diarrhea.  He reports no fevers or chills or cough or congestion, no urinary symptoms.    He was seen at [facility], had CT performed with potential leaking aneurysm  He was then transferred here.    REVIEW OF SYSTEMS  Pertinent positives include back pain, abdominal pain. Pertinent negatives include no fevers or vomiting. See HPI for details. All other systems reviewed and are negative.    PAST MEDICAL HISTORY       SURGICAL HISTORY  patient denies any surgical history    FAMILY HISTORY  History reviewed. No pertinent family history.    SOCIAL HISTORY  Social History     Tobacco Use   • Smoking status: Current Some Day Smoker   • Smokeless tobacco: Never Used   Substance and Sexual Activity   • Alcohol use: Yes   • Drug use: Yes      Social History     Substance and Sexual Activity   Drug Use Yes       CURRENT MEDICATIONS  Home Medications     Reviewed by Patrica Olivera R.N. (Registered Nurse) on 03/17/22 at 1138  Med List Status: Complete   Medication Last Dose Status        Patient Jose Taking any Medications                       ALLERGIES  No Known Allergies    PHYSICAL EXAM  VITAL SIGNS: /81   Pulse (!) 104   Temp 36.1 °C (97 °F) (Temporal)   Resp 16   Ht 1.803 m (5' 11\")   Wt 63.5 kg (140 lb)   SpO2 100%   BMI 19.53 kg/m²   Pulse ox interpretation: I interpret this pulse ox as normal.    Constitutional: Alert in no apparent distress.  HENT: No signs of trauma, " Bilateral external ears normal, Nose normal.   Eyes: Pupils are equal and reactive, Conjunctiva normal, Non-icteric.   Neck: Normal range of motion, No tenderness, Supple, No stridor.   Lymphatic: No lymphadenopathy noted.   Cardiovascular: Tachycardic, regular no murmurs.   Thorax & Lungs: Normal breath sounds, No respiratory distress, No wheezing, No chest tenderness.   Abdomen: Bowel sounds normal, Soft, No tenderness, No masses, No pulsatile masses. No peritoneal signs.  Skin: Warm, Dry, No erythema, No rash.   Back: No bony tenderness, No CVA tenderness.   Extremities: Intact distal pulses, No edema, No tenderness, No cyanosis,   Musculoskeletal: Good range of motion in all major joints. No tenderness to palpation or major deformities noted.   Neurologic: Alert , Normal motor function, Normal sensory function, No focal deficits noted.   Psychiatric: Affect normal, Judgment normal, Mood normal.     DIAGNOSTIC STUDIES / PROCEDURES    Labs Reviewed   LACTIC ACID - Abnormal; Notable for the following components:       Result Value    Lactic Acid 2.4 (*)     All other components within normal limits   CBC WITH DIFFERENTIAL - Abnormal; Notable for the following components:    WBC 17.9 (*)     RBC 3.94 (*)     Hemoglobin 13.5 (*)     Hematocrit 41.0 (*)     .1 (*)     MCH 34.3 (*)     MCHC 32.9 (*)     RDW 53.3 (*)     MPV 8.8 (*)     Neutrophils-Polys 84.10 (*)     Lymphocytes 5.40 (*)     Neutrophils (Absolute) 15.01 (*)     Lymphs (Absolute) 0.97 (*)     Monos (Absolute) 1.69 (*)     Immature Granulocytes (abs) 0.15 (*)     All other components within normal limits    Narrative:     Indicate which anticoagulants the patient is on:->NONE   COMP METABOLIC PANEL - Abnormal; Notable for the following components:    Potassium 6.2 (*)     Co2 19 (*)     Glucose 114 (*)     Calcium 7.9 (*)     AST(SGOT) 8 (*)     Albumin 3.0 (*)     Total Protein 5.8 (*)     All other components within normal limits    Narrative:      Indicate which anticoagulants the patient is on:->NONE   CREATINE KINASE    Narrative:     Indicate which anticoagulants the patient is on:->NONE   PROTHROMBIN TIME    Narrative:     Indicate which anticoagulants the patient is on:->NONE   SARS-COV ANTIGEN ENRIQUE   ESTIMATED GFR    Narrative:     Indicate which anticoagulants the patient is on:->NONE         RADIOLOGY  OUTSIDE IMAGES-DX CHEST   Final Result      DX-PORTABLE FLUORO > 1 HOUR    (Results Pending)     The radiologist's interpretation of all radiological studies have been reviewed and images independently viewed by me.    COURSE & MEDICAL DECISION MAKING  Nursing notes, VS, PMSFHx reviewed in chart.    11:42 AM Patient seen and examined at bedside.  Dr. Manley from vascular has seen the patient ready,labs, COVID19 testing.    In review of records, at [facility], patient was given 2 L NS, Zosyn, fentanyl, he is diagnostics showed pH 7.2, potassium 2.99, creatinine 1.4, lactate , WBC 22 otherwise unremarkable labs  CT of the abdomen pelvis showed fusiform aneurysmal dilation of the infrarenal abdominal aorta at 7.4 x 6.2 cm with intramural thrombus and potential hemorrhage although no rocio rupture, also potential for left pelvic phlegmon but no abscess    1203  Patient taken to the operating room for definitive care      Decision Making:  This is a 66 y.o. male presenting as transfer from outside facility with back and abdominal pain, had CT performed with abdominal aortic aneurysm with thrombus and hemorrhage.  Patient seen expeditiously by vascular surgery, Dr. Manley is taken to the operating room for further care    Patient is admitted to the operating room in critical condition    FINAL IMPRESSION  1. Ruptured aortic aneurysm, unspecified portion of aorta (HCC)         The note accurately reflects work and decisions made by me.  Carlos Turcios M.D.  3/17/2022  1:00 PM

## 2022-03-17 NOTE — CONSULTS
ACUTE CARE VASCULAR SERVICE  CONSULT NOTE      Date: 3/17/2022    Referring Provider: Carlos Turcios M.d.    Consulting Physician: Andrew Manley M.D. [facility]    -------------------------------------------------------------------------------------------------    Reason for consultation:  Ruptured AAA    HPI:  This is a 66 y.o. male who presented to outside hospital with 3-day history of waxing and waning back and abdominal pain.  Most recent episode of pain was severe enough that he decided to go to the ER in [place] for evaluation and CT scan there showed evidence of either a retroperitoneal hematoma or abscess, and also a 7 cm infrarenal abdominal aortic aneurysm.  Patient was transferred to [facility] for emergency evaluation.  Review of the CT scan shows that the retroperitoneal fluid is dense consistent with hematoma and a ruptured aneurysm is the most likely explanation for the patient's symptoms.  Fortunately the patient is alert and conversant and has been hemodynamically stable throughout the transfer.  Arrangements have been made to take the patient emergently to the operating room for endograft repair of his aneurysm.  His pain is currently 6 out of 10.  No pain or numbness in the lower extremities.  Patient does drink a pint of hard alcohol a day    History reviewed. No pertinent past medical history.    History reviewed. No pertinent surgical history.    No current facility-administered medications for this encounter.     No current outpatient medications on file.       Social History     Socioeconomic History   • Marital status: Not on file     Spouse name: Not on file   • Number of children: Not on file   • Years of education: Not on file   • Highest education level: Not on file   Occupational History   • Not on file   Tobacco Use   • Smoking status: Current Some Day Smoker   • Smokeless tobacco: Never Used   Substance and Sexual Activity   • Alcohol use: Yes   • Drug use: Yes   •  "Sexual activity: Not on file   Other Topics Concern   • Not on file   Social History Narrative   • Not on file     Social Determinants of Health     Financial Resource Strain: Not on file   Food Insecurity: Not on file   Transportation Needs: Not on file   Physical Activity: Not on file   Stress: Not on file   Social Connections: Not on file   Intimate Partner Violence: Not on file   Housing Stability: Not on file       History reviewed. No pertinent family history.    Allergies:  Patient has no known allergies.    Review of Systems:  Constitutional: Negative for fever, chills, weight loss,   HENT:   Negative for hearing loss or tinnitus    Eyes:    Negative for blurred vision, double vision, or loss of vision  Respiratory:  Negative for cough, hemoptysis, or wheezing    Cardiac:  Negative for chest pain or palpitations or orthopnea  Vascular:  Negative for claudication or rest pain   Gastrointestinal: Negative for nausea, vomiting, or abdominal pain     Negative for hematochezia or melena   Genitourinary: Negative for dysuria, frequency, or hematuria   Musculoskeletal: Negative for myalgias, back pain, or joint pain except as per HPI  Skin:   Negative for itching or rash  Neurological:  Negative for dizziness, headaches, or tremors     Negative for speech disturbance     Negative for extremity weakness or paresthesias  Endo/Heme:  Negative for easy bruising or bleeding  Psychiatric:  Negative for depression, suicidal ideas, or hallucinations    Physical Exam:  /82   Pulse (!) 101   Temp 36.6 °C (97.8 °F) (Temporal)   Resp 17   Ht 1.803 m (5' 11\")   Wt 63.5 kg (140 lb)   SpO2 98%     Constitutional: Alert, oriented, mild distress from back pain  HEENT:  Normocephalic and atraumatic, EOMI  Neck:   Supple, no JVD,   Cardiovascular: Regular rate and rhythm,   Pulmonary:  Good air entry bilaterally,    Abdominal:  Soft, non-tender, non-distended  Musculoskeletal: No edema, no tenderness  Neurological:  CN " II-XII grossly intact, no focal deficits  Skin:   Skin is warm and dry. No rash noted.  Psychiatric:  Normal mood and affect.  Vascular:  Extremities warm and well perfused    Radiology:  CT scan: evidence of either a retroperitoneal hematoma or abscess, and also a 7 cm infrarenal abdominal aortic aneurysm, retroperitoneal fluid is dense consistent with hematoma and a ruptured aneurysm is the most likely explanation    Assessment/Plan:  -Ruptured infrarenal abdominal aortic aneurysm    OR emergently for endograft repair    Andrew Manley MD  [facility]  Voalte preferred. Otherwise text to cell [phone] or call my office [phone]  __________________________________________________________________  Patient:Ray Myers   MRN:[id number]   CSN:[id number]

## 2022-03-17 NOTE — PROGRESS NOTES
ACUTE CARE VASCULAR SERVICE  PROGRESS NOTE      Ruptured AAA  Currently HDS  OR emergently for endovascular repair    Andrew Manley MD  [facility]  Voalte preferred. Otherwise text to cell [phone] or call my office [phone]  __________________________________________________________________  Patient:Ray Myers   MRN:[id number]   CSN:[id number]

## 2022-03-17 NOTE — ANESTHESIA PROCEDURE NOTES
Central Venous Line  Performed by: Vamshi Munson M.D.  Authorized by: Vamshi Munson M.D.     Start Time:  3/17/2022 12:40 PM  End Time:  3/17/2022 12:47 PM      provider hand hygiene performed prior to central venous catheter insertion, all 5 sterile barriers used (gloves, gown, cap, mask, large sterile drape) during central venous catheter insertion and skin prep agent completely dried prior to procedure    Patient Position:  Trendelenburg  Laterality:  Right  Site:  Internal jugular  Prep:  Chlorhexidine  Catheter Size:  7 Fr  Catheter Length (cm):  20  Number of Lumens:  Triple lumen  target vein identified, needle advanced into vein and blood aspirated and guidewire advanced into vein    Seldinger Technique?: Yes    Ultrasound-Guided: ultrasound-guided  Image captured, interpreted and electronically stored.  Sterile Gel and Probe Cover Used for Ultrasound?: Yes    Intravenous Verification: verified by ultrasound, venous blood return and chest x-ray pending    all ports aspirated, all ports flushed easily, guidewire was removed intact, biopatch was applied, line was sutured in place and dressing was applied    Events: patient tolerated procedure well with no complications    PA Catheter Placed?: No

## 2022-03-17 NOTE — ED TRIAGE NOTES
Chief Complaint   Patient presents with   • Abdominal Aortic Aneurysm     BIBA for above complaint. Per EMS patient had back pain for the last 2 days and abdominal pain today. Patient is A+Ox4. VSS. Patient was 80% on room air on arrival in outside ED. Placed on 3 liters. BP was in the 80's. Given 2 liters of NS. On arrival vss. Surgeon at bedside on arrival. Patient was found to have a 7 cm aaa that is leaking. Patient is stable on arrival.

## 2022-03-17 NOTE — ANESTHESIA PROCEDURE NOTES
Airway    Date/Time: 3/17/2022 12:37 PM  Performed by: Vamshi Munson M.D.  Authorized by: Vamshi Munson M.D.     Location:  OR  Urgency:  Elective  Indications for Airway Management:  Anesthesia      Spontaneous Ventilation: absent    Sedation Level:  Deep  Preoxygenated: Yes    Patient Position:  Sniffing  Mask Difficulty Assessment:  1 - vent by mask  Final Airway Type:  Endotracheal airway  Final Endotracheal Airway:  ETT  Cuffed: Yes    Technique Used for Successful ETT Placement:  Direct laryngoscopy    Insertion Site:  Oral  Blade Type:  Samara  Laryngoscope Blade/Videolaryngoscope Blade Size:  3  ETT Size (mm):  7.5  Measured from:  Teeth  ETT to Teeth (cm):  25  Placement Verified by: auscultation and capnometry    Cormack-Lehane Classification:  Grade IIa - partial view of glottis  Number of Attempts at Approach:  1

## 2022-03-17 NOTE — ANESTHESIA TIME REPORT
Anesthesia Start and Stop Event Times     Date Time Event    3/17/2022 1206 Ready for Procedure     1230 Anesthesia Start     1430 Anesthesia Stop        Responsible Staff  03/17/22    Name Role Begin End    Vamshi Munson M.D. Anesth 1230 1430        Preop Diagnosis (Free Text):  Pre-op Diagnosis     Abdominal Aortic Aneurysm        Preop Diagnosis (Codes):    Premium Reason  A. 3PM - 7AM    Comments:

## 2022-03-17 NOTE — ANESTHESIA POSTPROCEDURE EVALUATION
Patient: Ray Myers    Procedure Summary     Date: 03/17/22 Room / Location: [facility] 05 / SURGERY [facility]    Anesthesia Start: 1230 Anesthesia Stop: 1430    Procedure: EXCISION OR REPAIR, AAA ENDOVASCULAR (Groin) Diagnosis: (Abdominal Aortic Aneurysm)    Surgeons: Andrew Manley M.D. Responsible Provider: Vamshi Munson M.D.    Anesthesia Type: general ASA Status: 3 - Emergent          Final Anesthesia Type: general  Last vitals  BP   Blood Pressure : 139/81    Temp   36.1 °C (97 °F)    Pulse   (!) 104   Resp   16    SpO2   100 %      Anesthesia Post Evaluation    Patient location during evaluation: PACU  Patient participation: complete - patient participated  Level of consciousness: awake and alert  Pain score: 0    Airway patency: patent  Anesthetic complications: no  Cardiovascular status: hemodynamically stable  Respiratory status: acceptable  Hydration status: euvolemic    PONV: none          No complications documented.

## 2022-03-17 NOTE — ANESTHESIA PROCEDURE NOTES
Arterial Line  Performed by: Vamshi Munson M.D.  Authorized by: Vamshi Munson M.D.     Start Time:  3/17/2022 12:36 PM  End Time:  3/17/2022 12:37 PM  Localization: surface landmarks    Patient Location:  OR  Indication: continuous blood pressure monitoring        Catheter Size:  20 G  Seldinger Technique?: Yes    Laterality:  Right  Site:  Radial artery  Line Secured:  Antimicrobial disc, tape and transparent dressing  Events: patient tolerated procedure well with no complications

## 2022-03-18 PROBLEM — D64.89 ANEMIA DUE TO MULTIPLE MECHANISMS: Status: ACTIVE | Noted: 2022-03-18

## 2022-03-18 PROBLEM — F10.10 ALCOHOL ABUSE: Status: ACTIVE | Noted: 2022-03-18

## 2022-03-18 PROBLEM — Z78.9 NO CONTRAINDICATION TO DEEP VEIN THROMBOSIS (DVT) PROPHYLAXIS: Status: ACTIVE | Noted: 2022-03-18

## 2022-03-18 PROBLEM — Z72.0 TOBACCO ABUSE: Status: ACTIVE | Noted: 2022-03-18

## 2022-03-18 LAB
ANION GAP SERPL CALC-SCNC: 8 MMOL/L (ref 7–16)
BASOPHILS # BLD AUTO: 0.1 % (ref 0–1.8)
BASOPHILS # BLD: 0.02 K/UL (ref 0–0.12)
BUN SERPL-MCNC: 16 MG/DL (ref 8–22)
CALCIUM SERPL-MCNC: 7.2 MG/DL (ref 8.5–10.5)
CHLORIDE SERPL-SCNC: 105 MMOL/L (ref 96–112)
CO2 SERPL-SCNC: 22 MMOL/L (ref 20–33)
CREAT SERPL-MCNC: 0.93 MG/DL (ref 0.5–1.4)
EKG IMPRESSION: NORMAL
EOSINOPHIL # BLD AUTO: 0 K/UL (ref 0–0.51)
EOSINOPHIL NFR BLD: 0 % (ref 0–6.9)
ERYTHROCYTE [DISTWIDTH] IN BLOOD BY AUTOMATED COUNT: 53.9 FL (ref 35.9–50)
GFR SERPLBLD CREATININE-BSD FMLA CKD-EPI: 89 ML/MIN/1.73 M 2
GLUCOSE SERPL-MCNC: 113 MG/DL (ref 65–99)
HCT VFR BLD AUTO: 28 % (ref 42–52)
HGB BLD-MCNC: 9.1 G/DL (ref 14–18)
IMM GRANULOCYTES # BLD AUTO: 0.1 K/UL (ref 0–0.11)
IMM GRANULOCYTES NFR BLD AUTO: 0.7 % (ref 0–0.9)
LYMPHOCYTES # BLD AUTO: 1.36 K/UL (ref 1–4.8)
LYMPHOCYTES NFR BLD: 9.3 % (ref 22–41)
MCH RBC QN AUTO: 33.7 PG (ref 27–33)
MCHC RBC AUTO-ENTMCNC: 32.5 G/DL (ref 33.7–35.3)
MCV RBC AUTO: 103.7 FL (ref 81.4–97.8)
MONOCYTES # BLD AUTO: 1.44 K/UL (ref 0–0.85)
MONOCYTES NFR BLD AUTO: 9.9 % (ref 0–13.4)
NEUTROPHILS # BLD AUTO: 11.63 K/UL (ref 1.82–7.42)
NEUTROPHILS NFR BLD: 80 % (ref 44–72)
NRBC # BLD AUTO: 0 K/UL
NRBC BLD-RTO: 0 /100 WBC
PLATELET # BLD AUTO: 284 K/UL (ref 164–446)
PMV BLD AUTO: 9.2 FL (ref 9–12.9)
POTASSIUM SERPL-SCNC: 4.2 MMOL/L (ref 3.6–5.5)
RBC # BLD AUTO: 2.7 M/UL (ref 4.7–6.1)
SODIUM SERPL-SCNC: 135 MMOL/L (ref 135–145)
WBC # BLD AUTO: 14.6 K/UL (ref 4.8–10.8)

## 2022-03-18 PROCEDURE — A9270 NON-COVERED ITEM OR SERVICE: HCPCS | Performed by: SURGERY

## 2022-03-18 PROCEDURE — 93010 ELECTROCARDIOGRAM REPORT: CPT | Performed by: INTERNAL MEDICINE

## 2022-03-18 PROCEDURE — 700102 HCHG RX REV CODE 250 W/ 637 OVERRIDE(OP): Performed by: SURGERY

## 2022-03-18 PROCEDURE — 700111 HCHG RX REV CODE 636 W/ 250 OVERRIDE (IP): Performed by: SURGERY

## 2022-03-18 PROCEDURE — 99232 SBSQ HOSP IP/OBS MODERATE 35: CPT | Performed by: REGISTERED NURSE

## 2022-03-18 PROCEDURE — 80048 BASIC METABOLIC PNL TOTAL CA: CPT

## 2022-03-18 PROCEDURE — 700105 HCHG RX REV CODE 258: Performed by: SURGERY

## 2022-03-18 PROCEDURE — 700101 HCHG RX REV CODE 250: Performed by: SURGERY

## 2022-03-18 PROCEDURE — 85025 COMPLETE CBC W/AUTO DIFF WBC: CPT

## 2022-03-18 PROCEDURE — 770022 HCHG ROOM/CARE - ICU (200)

## 2022-03-18 RX ADMIN — KETOROLAC TROMETHAMINE 15 MG: 30 INJECTION, SOLUTION INTRAMUSCULAR; INTRAVENOUS at 11:14

## 2022-03-18 RX ADMIN — ACETAMINOPHEN 650 MG: 325 TABLET, FILM COATED ORAL at 17:17

## 2022-03-18 RX ADMIN — ACETAMINOPHEN 650 MG: 325 TABLET, FILM COATED ORAL at 11:14

## 2022-03-18 RX ADMIN — KETOROLAC TROMETHAMINE 15 MG: 30 INJECTION, SOLUTION INTRAMUSCULAR; INTRAVENOUS at 23:23

## 2022-03-18 RX ADMIN — KETOROLAC TROMETHAMINE 15 MG: 30 INJECTION, SOLUTION INTRAMUSCULAR; INTRAVENOUS at 05:25

## 2022-03-18 RX ADMIN — NICOTINE TRANSDERMAL SYSTEM 21 MG: 21 PATCH, EXTENDED RELEASE TRANSDERMAL at 05:34

## 2022-03-18 RX ADMIN — LABETALOL HYDROCHLORIDE 10 MG: 5 INJECTION INTRAVENOUS at 09:52

## 2022-03-18 RX ADMIN — SODIUM CHLORIDE: 9 INJECTION, SOLUTION INTRAVENOUS at 02:53

## 2022-03-18 RX ADMIN — KETOROLAC TROMETHAMINE 15 MG: 30 INJECTION, SOLUTION INTRAMUSCULAR; INTRAVENOUS at 17:16

## 2022-03-18 RX ADMIN — OXYCODONE 5 MG: 5 TABLET ORAL at 19:50

## 2022-03-18 RX ADMIN — ACETAMINOPHEN 650 MG: 325 TABLET, FILM COATED ORAL at 05:25

## 2022-03-18 RX ADMIN — ENOXAPARIN SODIUM 30 MG: 30 INJECTION SUBCUTANEOUS at 05:25

## 2022-03-18 RX ADMIN — ACETAMINOPHEN 650 MG: 325 TABLET, FILM COATED ORAL at 23:23

## 2022-03-18 ASSESSMENT — ENCOUNTER SYMPTOMS
DIZZINESS: 0
DIAPHORESIS: 0
MUSCULOSKELETAL NEGATIVE: 1
HEMATOLOGIC/LYMPHATIC NEGATIVE: 1
WEAKNESS: 0
NEUROLOGICAL NEGATIVE: 1
CHILLS: 0
ABDOMINAL PAIN: 0
HEADACHES: 0
PSYCHIATRIC NEGATIVE: 1
ALLERGIC/IMMUNOLOGIC NEGATIVE: 1
SEIZURES: 0

## 2022-03-18 ASSESSMENT — PAIN DESCRIPTION - PAIN TYPE
TYPE: ACUTE PAIN

## 2022-03-18 ASSESSMENT — FIBROSIS 4 INDEX: FIB4 SCORE: 0.65

## 2022-03-18 NOTE — PROGRESS NOTES
"  ACUTE CARE VASCULAR SERVICE  PROGRESS NOTE    Events  3/18/2022:  MEHNAZ, pain controlled, tolerating PO, no back/abdominal pain    Vitals  /61   Pulse 76   Temp 36.6 °C (97.9 °F) (Temporal)   Resp 19   Ht 1.803 m (5' 11\")   Wt 68.5 kg (151 lb)   SpO2 96%   BMI 21.06 kg/m²   UOP:         ~400 cc/12hrs    Exam  General: alert, conversant, NAD  Chest: good air entry  Heart: RRR  Abdomen: benign  Extremities: femoral puncture sites CDI, no hematoma, Palpable PT bilaterally    Labs  WBC 18 -> 14 today   Hgb 13 -> 9 today no e/o bleeding  Creatinine normal    A/P)  3/17/22: EVAR for rAAA    Doing well  Normal renal function  No e/o DTs at this time  No e/o bleeding  DC kwan and A-line  Activity and diet as tolerated    Monitor one more night, if OK in AM will clear for discharge      Andrew Manley MD  [facility]  Voalte preferred. Otherwise text to cell [phone] or call my office [phone]  __________________________________________________________________  Patient:Ray Myers   MRN:[id number]   CSN:[id number]    "

## 2022-03-18 NOTE — CARE PLAN
The patient is Stable - Low risk of patient condition declining or worsening    Shift Goals  Clinical Goals: pulse checks, stable hemodynamics  Patient Goals: rest, pain control  Family Goals: rest    Progress made toward(s) clinical / shift goals:        Problem: Knowledge Deficit - Standard  Goal: Patient and family/care givers will demonstrate understanding of plan of care, disease process/condition, diagnostic tests and medications  Outcome: Progressing  Note: Patient educated on plan and goals of care and disease process. Education provided on medications, procedures, and equipment. Will continue to re-inforce education when required. All questions and concerns answered at this time.       Problem: Pain - Standard  Goal: Alleviation of pain or a reduction in pain to the patient’s comfort goal  Outcome: Progressing  Note: Educated patient on the use of 1-10 pain scale and use of pain descriptors. Administered pain medication when needed per MAR. Non-pharmacological methods for pain control in place such as rest, repositioning, and enforcing a calm and conductive environment.

## 2022-03-18 NOTE — ASSESSMENT & PLAN NOTE
Malnutrition, bone marrow suppression from alcoholism, acute blood loss  Transfuse if hemoglobin <7  Trend serially

## 2022-03-18 NOTE — DISCHARGE PLANNING
Anticipated Discharge Disposition: likely home      Action: RN CM reviewed patient chart.  Therapy evals pending, six clicks 24.  No post acute referrals/order at this time.  No CM needs identified at this time.       Barriers to Discharge: medical clearance; therapy evals     Plan: HCM to remain available to assist with discharge planning needs and barriers

## 2022-03-18 NOTE — CARE PLAN
The patient is Stable - Low risk of patient condition declining or worsening    Shift Goals  Clinical Goals: pulse checks, stable hemodynamics  Patient Goals: rest, pain control  Family Goals: rest    Progress made toward(s) clinical / shift goals:      0952 BP>160 HR 65 gave prn labetolol    1030 oob to chair 1 person assist.     1200 pt tired, transferred back to bed.     1500 Kwan removed per Dr. Manley    1515 L A-line removed. Pressure held hemostasis achieved.    1630 250 ml urine out after kwan removal.    1700 walked around unit 1 lap SBA    POC discharge tomorrow     Problem: Knowledge Deficit - Standard  Goal: Patient and family/care givers will demonstrate understanding of plan of care, disease process/condition, diagnostic tests and medications  Outcome: Progressing     Problem: Pain - Standard  Goal: Alleviation of pain or a reduction in pain to the patient’s comfort goal  Outcome: Progressing     Problem: Skin Integrity  Goal: Skin integrity is maintained or improved  Outcome: Progressing       Patient is not progressing towards the following goals:

## 2022-03-18 NOTE — PROGRESS NOTES
12-hour chart check.    Monitor summary review:  Agree with calculated measurements   Prolonged QT

## 2022-03-18 NOTE — PROGRESS NOTES
Trauma / Surgical Daily Progress Note    Date of Service  3/18/2022    Chief Complaint  66 y.o. male admitted 3/17/2022 with a ruptured abdominal aortic aneurysm    POD # 1 EVAR with bilateral endarterectomies of the CFA    Interval Events  Doing well, denies pain  Hemodynamically stable  Tolerating diet    - Advance diet to regular  - CBC/BMP in AM  - Monitor for alcohol withdrawal, high risk    Review of Systems  Review of Systems   Constitutional: Negative for chills and diaphoresis.   HENT: Negative for congestion and ear discharge.    Eyes: Negative for visual disturbance.   Cardiovascular: Negative for chest pain.   Gastrointestinal: Negative for abdominal pain.   Genitourinary: Negative.    Musculoskeletal: Negative.    Allergic/Immunologic: Negative.    Neurological: Negative.  Negative for dizziness, seizures, weakness and headaches.   Hematological: Negative.    Psychiatric/Behavioral: Negative.    All other systems reviewed and are negative.       Vital Signs for last 24 hours  Temp:  [37.1 °C (98.8 °F)] 37.1 °C (98.8 °F)  Pulse:  [63-81] 75  Resp:  [16-43] 23  BP: (100-154)/(53-74) 126/60  SpO2:  [93 %-98 %] 97 %    Hemodynamic parameters for last 24 hours       Respiratory Data     Respiration: (!) 23, Pulse Oximetry: 97 %             Physical Exam  Physical Exam  Vitals and nursing note reviewed.   Constitutional:       General: He is not in acute distress.     Appearance: Normal appearance.   HENT:      Head: Normocephalic and atraumatic.      Nose: Nose normal.      Mouth/Throat:      Mouth: Mucous membranes are moist.      Pharynx: Oropharynx is clear.   Eyes:      Extraocular Movements: Extraocular movements intact.      Conjunctiva/sclera: Conjunctivae normal.      Pupils: Pupils are equal, round, and reactive to light.   Cardiovascular:      Rate and Rhythm: Normal rate and regular rhythm.      Pulses: Normal pulses.           Femoral pulses are 2+ on the right side and 2+ on the left side.        Dorsalis pedis pulses are 2+ on the right side and 2+ on the left side.      Heart sounds: Normal heart sounds. No murmur heard.  Pulmonary:      Effort: Pulmonary effort is normal. No respiratory distress.      Breath sounds: Normal breath sounds.   Abdominal:      General: Abdomen is flat. Bowel sounds are normal.      Palpations: Abdomen is soft.      Tenderness: There is no abdominal tenderness.   Musculoskeletal:         General: Normal range of motion.      Cervical back: Normal range of motion.      Right lower leg: No edema.      Left lower leg: No edema.   Skin:     General: Skin is warm and dry.      Capillary Refill: Capillary refill takes less than 2 seconds.      Comments: Bilateral groin incisions, well approximated with glue, no evidence of bleeding.    Neurological:      General: No focal deficit present.      Mental Status: He is alert and oriented to person, place, and time. Mental status is at baseline.   Psychiatric:         Mood and Affect: Mood normal.         Laboratory  Recent Results (from the past 24 hour(s))   EKG    Collection Time: 22  5:17 PM   Result Value Ref Range    Report       [facility]    Test Date:  2022  Pt Name:    JOSETTE PHILLIPS                 Department: [facility]  MRN:        [id number]                      Room:       [facility]  Gender:     Male                         Technician: [facility]  :        1955                   Requested By:JENNIFER NICHOLSON  Order #:    [id number]                    Reading MD: Jennifer Cee MD    Measurements  Intervals                                Axis  Rate:       71                           P:          67  GA:         148                          QRS:        -26  QRSD:       92                           T:          -31  QT:         472  QTc:        513    Interpretive Statements  SINUS RHYTHM  BORDERLINE LOW VOLTAGE IN FRONTAL LEADS  INFERIOR INFARCT, OLD  INFERIOR T WAVE ABNORMALITY, NON-SPECIFIC  PROLONGED QT  INTERVAL  Electronically Signed On 3- 6:44:45 PDT by Andrew Cee MD     CBC WITH DIFFERENTIAL    Collection Time: 03/18/22  4:27 AM   Result Value Ref Range    WBC 14.6 (H) 4.8 - 10.8 K/uL    RBC 2.70 (L) 4.70 - 6.10 M/uL    Hemoglobin 9.1 (L) 14.0 - 18.0 g/dL    Hematocrit 28.0 (L) 42.0 - 52.0 %    .7 (H) 81.4 - 97.8 fL    MCH 33.7 (H) 27.0 - 33.0 pg    MCHC 32.5 (L) 33.7 - 35.3 g/dL    RDW 53.9 (H) 35.9 - 50.0 fL    Platelet Count 284 164 - 446 K/uL    MPV 9.2 9.0 - 12.9 fL    Neutrophils-Polys 80.00 (H) 44.00 - 72.00 %    Lymphocytes 9.30 (L) 22.00 - 41.00 %    Monocytes 9.90 0.00 - 13.40 %    Eosinophils 0.00 0.00 - 6.90 %    Basophils 0.10 0.00 - 1.80 %    Immature Granulocytes 0.70 0.00 - 0.90 %    Nucleated RBC 0.00 /100 WBC    Neutrophils (Absolute) 11.63 (H) 1.82 - 7.42 K/uL    Lymphs (Absolute) 1.36 1.00 - 4.80 K/uL    Monos (Absolute) 1.44 (H) 0.00 - 0.85 K/uL    Eos (Absolute) 0.00 0.00 - 0.51 K/uL    Baso (Absolute) 0.02 0.00 - 0.12 K/uL    Immature Granulocytes (abs) 0.10 0.00 - 0.11 K/uL    NRBC (Absolute) 0.00 K/uL   Basic Metabolic Panel    Collection Time: 03/18/22  4:27 AM   Result Value Ref Range    Sodium 135 135 - 145 mmol/L    Potassium 4.2 3.6 - 5.5 mmol/L    Chloride 105 96 - 112 mmol/L    Co2 22 20 - 33 mmol/L    Glucose 113 (H) 65 - 99 mg/dL    Bun 16 8 - 22 mg/dL    Creatinine 0.93 0.50 - 1.40 mg/dL    Calcium 7.2 (L) 8.5 - 10.5 mg/dL    Anion Gap 8.0 7.0 - 16.0   ESTIMATED GFR    Collection Time: 03/18/22  4:27 AM   Result Value Ref Range    GFR (CKD-EPI) 89 >60 mL/min/1.73 m 2       Fluids    Intake/Output Summary (Last 24 hours) at 3/18/2022 1630  Last data filed at 3/18/2022 1400  Gross per 24 hour   Intake 1892.5 ml   Output 1060 ml   Net 832.5 ml       Core Measures & Quality Metrics  Core Measures & Quality Metrics  SCARLETT Score  ETOH Screening    Assessment/Plan  * Ruptured abdominal aortic aneurysm (AAA) (HCC)- (present on admission)  Assessment & Plan  From [facility]  [place] CT: Large infrarenal aneurysm, without free rupture  3/17 EVAR with bilateral endarterectomies of the CFA  Michael Manley MD - Vascular    Anemia due to multiple mechanisms- (present on admission)  Assessment & Plan  Malnutrition, bone marrow suppression from alcoholism, acute blood loss  Transfuse if hemoglobin <7  Trend serially    Tobacco abuse- (present on admission)  Assessment & Plan  At least 1ppd for many years  Nicotine replacement ordered on admission    Alcohol abuse- (present on admission)  Assessment & Plan  Drinks a pint of hard alcohol a day  No recent cessation  At risk for withdrawal    No contraindication to deep vein thrombosis (DVT) prophylaxis- (present on admission)  Assessment & Plan  Lovenox for chemical DVT prophylaxis started post-op      Discussed patient condition with RN, Patient and trauma surgery Dr. Carreon.

## 2022-03-18 NOTE — OR NURSING
1700  Patient unsure of his sister-in-law's phone number,  He told me that her name is Hailey Myers in [place]. I can't find any mention of family in the chart.  I attempted to reach Hailey at [phone] (online White Pages) but got a busy signal several times.  I spoke with Magda, ICU RN, patient will have ordered 12 lead EKG done shortly.

## 2022-03-18 NOTE — ASSESSMENT & PLAN NOTE
From [facility] CT: Large infrarenal aneurysm, without free rupture  3/17 EVAR with bilateral endarterectomies of the CFA  Michael Manley MD - Vascular

## 2022-03-19 ENCOUNTER — PHARMACY VISIT (OUTPATIENT)
Dept: PHARMACY | Facility: MEDICAL CENTER | Age: 67
End: 2022-03-19
Payer: COMMERCIAL

## 2022-03-19 VITALS
DIASTOLIC BLOOD PRESSURE: 65 MMHG | OXYGEN SATURATION: 96 % | WEIGHT: 151 LBS | HEART RATE: 93 BPM | RESPIRATION RATE: 22 BRPM | SYSTOLIC BLOOD PRESSURE: 139 MMHG | HEIGHT: 71 IN | BODY MASS INDEX: 21.14 KG/M2 | TEMPERATURE: 98.8 F

## 2022-03-19 PROBLEM — Z78.9 NO CONTRAINDICATION TO DEEP VEIN THROMBOSIS (DVT) PROPHYLAXIS: Status: RESOLVED | Noted: 2022-03-18 | Resolved: 2022-03-19

## 2022-03-19 LAB
ANION GAP SERPL CALC-SCNC: 8 MMOL/L (ref 7–16)
BASOPHILS # BLD AUTO: 0.2 % (ref 0–1.8)
BASOPHILS # BLD: 0.02 K/UL (ref 0–0.12)
BUN SERPL-MCNC: 14 MG/DL (ref 8–22)
CALCIUM SERPL-MCNC: 7.5 MG/DL (ref 8.5–10.5)
CHLORIDE SERPL-SCNC: 109 MMOL/L (ref 96–112)
CO2 SERPL-SCNC: 23 MMOL/L (ref 20–33)
CREAT SERPL-MCNC: 0.71 MG/DL (ref 0.5–1.4)
EOSINOPHIL # BLD AUTO: 0.06 K/UL (ref 0–0.51)
EOSINOPHIL NFR BLD: 0.7 % (ref 0–6.9)
ERYTHROCYTE [DISTWIDTH] IN BLOOD BY AUTOMATED COUNT: 55.6 FL (ref 35.9–50)
GFR SERPLBLD CREATININE-BSD FMLA CKD-EPI: 100 ML/MIN/1.73 M 2
GLUCOSE SERPL-MCNC: 98 MG/DL (ref 65–99)
HCT VFR BLD AUTO: 25.9 % (ref 42–52)
HGB BLD-MCNC: 8.3 G/DL (ref 14–18)
IMM GRANULOCYTES # BLD AUTO: 0.06 K/UL (ref 0–0.11)
IMM GRANULOCYTES NFR BLD AUTO: 0.7 % (ref 0–0.9)
LYMPHOCYTES # BLD AUTO: 1.78 K/UL (ref 1–4.8)
LYMPHOCYTES NFR BLD: 19.7 % (ref 22–41)
MCH RBC QN AUTO: 34 PG (ref 27–33)
MCHC RBC AUTO-ENTMCNC: 32 G/DL (ref 33.7–35.3)
MCV RBC AUTO: 106.1 FL (ref 81.4–97.8)
MONOCYTES # BLD AUTO: 0.68 K/UL (ref 0–0.85)
MONOCYTES NFR BLD AUTO: 7.5 % (ref 0–13.4)
NEUTROPHILS # BLD AUTO: 6.45 K/UL (ref 1.82–7.42)
NEUTROPHILS NFR BLD: 71.2 % (ref 44–72)
NRBC # BLD AUTO: 0 K/UL
NRBC BLD-RTO: 0 /100 WBC
PLATELET # BLD AUTO: 270 K/UL (ref 164–446)
PMV BLD AUTO: 9.1 FL (ref 9–12.9)
POTASSIUM SERPL-SCNC: 4 MMOL/L (ref 3.6–5.5)
RBC # BLD AUTO: 2.44 M/UL (ref 4.7–6.1)
SODIUM SERPL-SCNC: 140 MMOL/L (ref 135–145)
WBC # BLD AUTO: 9.1 K/UL (ref 4.8–10.8)

## 2022-03-19 PROCEDURE — 700102 HCHG RX REV CODE 250 W/ 637 OVERRIDE(OP): Performed by: SURGERY

## 2022-03-19 PROCEDURE — A9270 NON-COVERED ITEM OR SERVICE: HCPCS | Performed by: SURGERY

## 2022-03-19 PROCEDURE — 99239 HOSP IP/OBS DSCHRG MGMT >30: CPT | Performed by: REGISTERED NURSE

## 2022-03-19 PROCEDURE — 80048 BASIC METABOLIC PNL TOTAL CA: CPT

## 2022-03-19 PROCEDURE — RXMED WILLOW AMBULATORY MEDICATION CHARGE: Performed by: REGISTERED NURSE

## 2022-03-19 PROCEDURE — 85025 COMPLETE CBC W/AUTO DIFF WBC: CPT

## 2022-03-19 PROCEDURE — 700105 HCHG RX REV CODE 258: Performed by: SURGERY

## 2022-03-19 RX ORDER — FOLIC ACID 1 MG/1
1 TABLET ORAL DAILY
Qty: 30 TABLET | Refills: 12 | Status: SHIPPED | OUTPATIENT
Start: 2022-03-19 | End: 2022-07-27

## 2022-03-19 RX ORDER — FUROSEMIDE 40 MG/1
40 TABLET ORAL ONCE
Status: COMPLETED | OUTPATIENT
Start: 2022-03-19 | End: 2022-03-19

## 2022-03-19 RX ORDER — FOLIC ACID 1 MG/1
1 TABLET ORAL DAILY
Status: DISCONTINUED | OUTPATIENT
Start: 2022-03-19 | End: 2022-03-19 | Stop reason: HOSPADM

## 2022-03-19 RX ORDER — ACETAMINOPHEN 325 MG/1
650 TABLET ORAL EVERY 6 HOURS
Qty: 30 TABLET | Refills: 0 | COMMUNITY
Start: 2022-03-19 | End: 2022-07-27

## 2022-03-19 RX ORDER — GAUZE BANDAGE 2" X 2"
100 BANDAGE TOPICAL DAILY
Status: DISCONTINUED | OUTPATIENT
Start: 2022-03-19 | End: 2022-03-19 | Stop reason: HOSPADM

## 2022-03-19 RX ORDER — LANOLIN ALCOHOL/MO/W.PET/CERES
100 CREAM (GRAM) TOPICAL DAILY
Qty: 30 TABLET | Refills: 12 | Status: SHIPPED | OUTPATIENT
Start: 2022-03-19 | End: 2022-07-27

## 2022-03-19 RX ORDER — FOLIC ACID 1 MG/1
1 TABLET ORAL DAILY
Qty: 30 TABLET | Refills: 12 | Status: SHIPPED | OUTPATIENT
Start: 2022-03-19 | End: 2022-03-19 | Stop reason: SDUPTHER

## 2022-03-19 RX ADMIN — NICOTINE TRANSDERMAL SYSTEM 21 MG: 21 PATCH, EXTENDED RELEASE TRANSDERMAL at 05:45

## 2022-03-19 RX ADMIN — FOLIC ACID 1 MG: 1 TABLET ORAL at 10:18

## 2022-03-19 RX ADMIN — SODIUM CHLORIDE: 9 INJECTION, SOLUTION INTRAVENOUS at 05:52

## 2022-03-19 RX ADMIN — THIAMINE HCL TAB 100 MG 100 MG: 100 TAB at 10:19

## 2022-03-19 RX ADMIN — ACETAMINOPHEN 650 MG: 325 TABLET, FILM COATED ORAL at 05:46

## 2022-03-19 RX ADMIN — FUROSEMIDE 40 MG: 40 TABLET ORAL at 11:18

## 2022-03-19 RX ADMIN — ACETAMINOPHEN 650 MG: 325 TABLET, FILM COATED ORAL at 11:19

## 2022-03-19 ASSESSMENT — ENCOUNTER SYMPTOMS
CONSTITUTIONAL NEGATIVE: 1
PSYCHIATRIC NEGATIVE: 1
NEUROLOGICAL NEGATIVE: 1
CARDIOVASCULAR NEGATIVE: 1
EYES NEGATIVE: 1
RESPIRATORY NEGATIVE: 1
DIZZINESS: 0
BLURRED VISION: 0
GASTROINTESTINAL NEGATIVE: 1
MUSCULOSKELETAL NEGATIVE: 1
FOCAL WEAKNESS: 0
DOUBLE VISION: 0

## 2022-03-19 ASSESSMENT — PAIN DESCRIPTION - PAIN TYPE: TYPE: ACUTE PAIN

## 2022-03-19 NOTE — DISCHARGE SUMMARY
Discharge Summary    DATE OF ADMISSION: 3/17/2022    DATE OF DISCHARGE: 3/19/2022    DISCHARGE DIAGNOSIS:  ? Ruptured 7 cm infrarenal abdominal aortic aneurysm with contained retroperitoneal hematoma    CONSULTATIONS:  ? none    PROCEDURES:  Endovascular repair of ruptured infrarenal abdominal aortic aneurysm with [place] Excluder endograft with 2 docking limbs    BRIEF HPI and HOSPITAL COURSE:  ? Admitted with above, see admission history and physical. Underwent procedure as above. On day of discharge, the patient has stable vital signs, on room air, tolerating an oral diet, and pain is well controlled with PO meds. The patient is stable for discharge to home.    Review of Systems   Constitutional: Negative.    Eyes: Negative.  Negative for blurred vision and double vision.   Respiratory: Negative.    Cardiovascular: Negative.    Gastrointestinal: Negative.    Genitourinary: Negative.    Musculoskeletal: Negative.    Neurological: Negative.  Negative for dizziness and focal weakness.   Psychiatric/Behavioral: Negative.    All other systems reviewed and are negative.      Physical Exam  Vitals and nursing note reviewed.   Constitutional:       Appearance: Normal appearance.   HENT:      Head: Normocephalic and atraumatic.      Nose: Nose normal.      Mouth/Throat:      Mouth: Mucous membranes are moist.      Pharynx: Oropharynx is clear.   Eyes:      Pupils: Pupils are equal, round, and reactive to light.   Cardiovascular:      Rate and Rhythm: Normal rate.      Pulses: Normal pulses.           Femoral pulses are 2+ on the right side and 2+ on the left side.       Dorsalis pedis pulses are 2+ on the right side and 2+ on the left side.      Heart sounds: Normal heart sounds.   Pulmonary:      Effort: Pulmonary effort is normal.      Breath sounds: Normal breath sounds.   Abdominal:      General: Abdomen is flat. Bowel sounds are normal.      Palpations: Abdomen is soft.   Musculoskeletal:      Right lower leg: No  edema.      Left lower leg: No edema.   Skin:     General: Skin is warm and dry.      Comments: Bilateral groin incisions well approximated with glue. Clean, dry, and intact.   Neurological:      General: No focal deficit present.      Mental Status: He is alert.         DISPOSITION: Discharged home on 3/19/2022. The patient was and questions were answered. Specifically, signs and symptoms of infection, respiratory decompensation,  and persistent or worsening pain were discussed and the patient agrees to seek medical attention if any of these develop.    DISCHARGE MEDICATIONS:  The patients controlled substance history was reviewed and a controlled substance use informed consent (if applicable) was provided by [facility] and the patient has been prescribed.     Medication List      START taking these medications      Instructions   acetaminophen 325 MG Tabs  Commonly known as: Tylenol   Take 2 Tablets by mouth every 6 hours.  Dose: 650 mg     folic acid 1 MG Tabs  Commonly known as: FOLVITE   Take 1 Tablet by mouth every day.  Dose: 1 mg     thiamine 100 MG tablet  Commonly known as: THIAMINE   Take 1 Tablet by mouth every day.  Dose: 100 mg            ACTIVITY:  No strenuous exercise or heavy lifting (more than 10 lbs) until released in follow up.      WOUND CARE:  You may shower, but do not submerge in a bath for at least two weeks. If you have wound dressings, they may come off after 48 hours. If you have skin glue to the wound, this will fall off on its own, do not pick at it. If you have steri strips to the wound, these will fall off on their own, do not pick at them, may trim the edges if needed.      DIET:  Orders Placed This Encounter   Procedures   • Diet Order Diet: Regular     Standing Status:   Standing     Number of Occurrences:   1     Order Specific Question:   Diet:     Answer:   Regular [1]       FOLLOW UP:  Andrew Manley M.D.  [address]  [facility] [address]  [place]  [address]  [phone]    Schedule an appointment as soon as possible for a visit in 1 week        TIME SPENT ON DISCHARGE: 40 minutes      ____________________________________________  DONTA Stoner    DD: 3/19/2022 9:35 AM

## 2022-03-19 NOTE — DIETARY
Nutrition Services:  66 year old male admitted for large abdominal mass.  Per chart review patient with MST score of 2 related to weight loss and poor PO.  PO intake has been adequate since admited with > 50-75% of meals consumed. S/P surgery 3/17 for ruptured AAA. Chart reviewed.  No other nutrition triggers noted at this time and it appears that patient to likely D/C soon.     Plan/Recommend:  · RD will monitor per department policy while admitted and follow-up for nutrition interventions as needed.

## 2022-03-19 NOTE — CARE PLAN
The patient is Stable - Low risk of patient condition declining or worsening    Shift Goals  Clinical Goals: pulse checks, stable hemodynamics  Patient Goals: rest, pain control  Family Goals: rest    Progress made toward(s) clinical / shift goals:  Ambulation. Pain management. Neuro/vascular checks Q4hrs    Problem: Knowledge Deficit - Standard  Goal: Patient and family/care givers will demonstrate understanding of plan of care, disease process/condition, diagnostic tests and medications  Note: Updated patient/daughter on current POC. All questions/concerns addressed at this time.     Problem: Pain - Standard  Goal: Alleviation of pain or a reduction in pain to the patient’s comfort goal  Note: Pain assessed utilizing 0-10 Pain assessment tool. Pain medications given per MAR        Patient is not progressing towards the following goals:

## 2022-03-19 NOTE — DISCHARGE INSTRUCTIONS
Post Operative Discharge Instructions:    1. DIET: Upon discharge from the hospital you may resume your normal preoperative diet. Depending on how you are feeling and whether you have nausea or not, you may wish to stay with a bland diet for the first few days. However, you can advance this as quickly as you feel ready.    2. ACTIVITIES: After discharge from the hospital, you may resume full routine activities. However, there should be no heavy lifting (greater than 10 pounds) and no strenuous activities until after your follow-up visit. Otherwise, routine activities of daily living are acceptable.    3. DRIVING: You may drive whenever you are off pain medications and are able to perform the activities needed to drive, i.e. turning, bending, twisting, etc.    4. BATHING: You may get the wound wet at any time after leaving the hospital. You may shower, but do not submerge in a bath for at least two weeks. If you have wound dressings, they may come off after 48 hours. If you have skin glue to the wound, this will fall off on its own, do not pick at it. If you have steri strips to the wound, these will fall off on their own, do not pick at them, may trim the edges if needed.    5. BOWEL FUNCTION: A few patients, after this operation, will develop either frequent or loose stools after meals. This usually corrects itself after a few days, to a few weeks. If this occurs, do not worry; it is not unusual and will resolve. Much more common than loose stools, is constipation. The combination of pain medication and decreased activity level can cause constipation in otherwise normal patients. If you feel this is occurring, take a laxative (Milk of Magnesia, Ex-Lax, Senokot, etc.) until the problem has resolved.    6. PAIN MEDICATION: You will be given a prescription for pain medication at discharge. Please take these as directed. It is important to remember not to take medications on an empty stomach as this may cause  nausea. You may also take over the counter acetaminophen and/or NSAIDS (ibuprofen, Aleve, Advil, Motrin) per the package instructions.     7.CALL IF YOU HAVE: (1) Fevers to more than 101F, (2) Unusual chest or leg pain, (3) Drainage or fluid from incision that may be foul smelling, increased tenderness or soreness at the wound or the wound edges are no longer together, redness or swelling at the incision site. Please do not hesitate to call with any other questions.      Abdominal Aortic Aneurysm    An aneurysm is a bulge in one of the blood vessels that carry blood away from the heart (artery). It happens when blood pushes up against a weak or damaged place in the wall of an artery. An abdominal aortic aneurysm happens in the main artery of the body (aorta).  Some aneurysms may not cause problems. If it grows, it can burst or tear, causing bleeding inside the body. This is an emergency. It needs to be treated right away.  What are the causes?  The exact cause of this condition is not known.  What increases the risk?  The following may make you more likely to get this condition:  · Being a male who is 60 years of age or older.  · Being white ().  · Using tobacco.  · Having a family history of aneurysms.  · Having the following conditions:  ? Hardening of the arteries (arteriosclerosis).  ? Inflammation of the walls of an artery (arteritis).  ? Certain genetic conditions.  ? Being very overweight (obesity).  ? An infection in the wall of the aorta (infectious aortitis).  ? High cholesterol.  ? High blood pressure (hypertension).  What are the signs or symptoms?  Symptoms depend on the size of the aneurysm and how fast it is growing. Most grow slowly and do not cause any symptoms. If symptoms do occur, they may include:  · Pain in the belly (abdomen), side, or back.  · Feeling full after eating only small amounts of food.  · Feeling a throbbing lump in the belly.  Symptoms that the aneurysm has burst  (ruptured) include:  · Sudden, very bad pain in the belly, side, or back.  · Feeling sick to your stomach (nauseous).  · Throwing up (vomiting).  · Feeling light-headed or passing out.  How is this treated?  Treatment for this condition depends on:  · The size of the aneurysm.  · How fast it is growing.  · Your age.  · Your risk of having it burst.  If your aneurysm is smaller than 2 inches (5 cm), your doctor may manage it by:  · Checking it often to see if it is getting bigger. You may have an imaging test (ultrasound) to check it every 3-6 months, every year, or every few years.  · Giving you medicines to:  ? Control blood pressure.  ? Treat pain.  ? Fight infection.  If your aneurysm is larger than 2 inches (5 cm), you may need surgery to fix it.  Follow these instructions at home:  Lifestyle  · Do not use any products that have nicotine or tobacco in them. This includes cigarettes, e-cigarettes, and chewing tobacco. If you need help quitting, ask your doctor.  · Get regular exercise. Ask your doctor what types of exercise are best for you.  Eating and drinking  · Eat a heart-healthy diet. This includes eating plenty of:  ? Fresh fruits and vegetables.  ? Whole grains.  ? Low-fat (lean) protein.  ? Low-fat dairy products.  · Avoid foods that are high in saturated fat and cholesterol. These foods include red meat and some dairy products.  · Do not drink alcohol if:  ? Your doctor tells you not to drink.  ? You are pregnant, may be pregnant, or are planning to become pregnant.  · If you drink alcohol:  ? Limit how much you use to:  § 0-1 drink a day for women.  § 0-2 drinks a day for men.  ? Be aware of how much alcohol is in your drink. In the [place] one drink equals any of these:  § One typical bottle of beer (12 oz).  § One-half glass of wine (5 oz).  § One shot of hard liquor (1½ oz).  General instructions  · Take over-the-counter and prescription medicines only as told by your doctor.  · Keep your blood  pressure within normal limits. Ask your doctor what your blood pressure should be.  · Have your blood sugar (glucose) level and cholesterol levels checked regularly. Keep your blood sugar level and cholesterol levels within normal limits.  · Avoid heavy lifting and activities that take a lot of effort. Ask your doctor what activities are safe for you.  · Keep all follow-up visits as told by your doctor. This is important.  ? Talk to your doctor about regular screenings to see if the aneurysm is getting bigger.  Contact a doctor if you:  · Have pain in your belly, side, or back.  · Have a throbbing feeling in your belly.  · Have a family history of aneurysms.  Get help right away if you:  · Have sudden, bad pain in your belly, side, or back.  · Feel sick to your stomach.  · Throw up.  · Have trouble pooping (constipation).  · Have trouble peeing (urinating).  · Feel light-headed.  · Have a fast heart rate when you stand.  · Have sweaty skin that is cold to the touch (clammy).  · Have shortness of breath.  · Have a fever.  These symptoms may be an emergency. Do not wait to see if the symptoms will go away. Get medical help right away. Call your local emergency services (911 in the [place] Do not drive yourself to the hospital.  Summary  · An aneurysm is a bulge in one of the blood vessels that carry blood away from the heart (artery). Some aneurysms may not cause problems.  · You may need to have yours checked often. If it grows, it can burst or tear. This causes bleeding inside the body. It needs to be treated right away.  · Follow instructions from your doctor about healthy lifestyle changes.  · Keep all follow-up visits as told by your doctor. This is important.  This information is not intended to replace advice given to you by your health care provider. Make sure you discuss any questions you have with your health care provider.  Document Released: 04/14/2014 Document Revised: 04/06/2020 Document Reviewed:  07/27/2019  [employer] Patient Education © 2020 [employer]        Discharge Instructions    Discharged to home by car with friend. Discharged via wheelchair, hospital escort: Yes.  Special equipment needed: Not Applicable    Be sure to schedule a follow-up appointment with your primary care doctor or any specialists as instructed.     Discharge Plan:   Diet Plan: Discussed  Activity Level: Discussed  Confirmed Follow up Appointment: Patient to Call and Schedule Appointment  Confirmed Symptoms Management: Discussed  Medication Reconciliation Updated: Yes  Influenza Vaccine Indication: Patient Refuses    I understand that a diet low in cholesterol, fat, and sodium is recommended for good health. Unless I have been given specific instructions below for another diet, I accept this instruction as my diet prescription.   Other diet:      Special Instructions: None    · Is patient discharged on Warfarin / Coumadin?   No     Depression / Suicide Risk    As you are discharged from this [facility] facility, it is important to learn how to keep safe from harming yourself.    Recognize the warning signs:  · Abrupt changes in personality, positive or negative- including increase in energy   · Giving away possessions  · Change in eating patterns- significant weight changes-  positive or negative  · Change in sleeping patterns- unable to sleep or sleeping all the time   · Unwillingness or inability to communicate  · Depression  · Unusual sadness, discouragement and loneliness  · Talk of wanting to die  · Neglect of personal appearance   · Rebelliousness- reckless behavior  · Withdrawal from people/activities they love  · Confusion- inability to concentrate     If you or a loved one observes any of these behaviors or has concerns about self-harm, here's what you can do:  · Talk about it- your feelings and reasons for harming yourself  · Remove any means that you might use to hurt yourself (examples: pills, rope, extension  cords, firearm)  · Get professional help from the community (Mental Health, Substance Abuse, psychological counseling)  · Do not be alone:Call your Safe Contact- someone whom you trust who will be there for you.  · Call your local CRISIS HOTLINE 299-4029 or 700-015-0590  · Call your local Children's Mobile Crisis Response Team [place] (165) 307-9449 or www.[employer]  · Call the toll free National Suicide Prevention Hotlines   · National Suicide Prevention Lifeline 165-706-BKBW (0427)  · National Hope Line Network 800-SUICIDE (330-9454)

## 2022-03-19 NOTE — CARE PLAN
"  Problem: Knowledge Deficit - Standard  Goal: Patient and family/care givers will demonstrate understanding of plan of care, disease process/condition, diagnostic tests and medications  Outcome: Met     Problem: Pain - Standard  Goal: Alleviation of pain or a reduction in pain to the patient’s comfort goal  Outcome: Met     Problem: Skin Integrity  Goal: Skin integrity is maintained or improved  Outcome: Met   The patient is stable and ready for discharge.    Shift Goals  Clinical Goals: Stable neuro and hemodynamics  Patient Goals: \"To get me a beer\"  Family Goals: n/a    Progress made toward(s) clinical / shift goals:  yes, except \"getting the pt a beer.\"    Patient is not progressing towards the following goals:      "

## 2022-03-19 NOTE — PROGRESS NOTES
ACUTE CARE VASCULAR SERVICE  PROGRESS NOTE      Doing well  Home today  FU with me 2 weeks    Andrew Manley MD  [facility]  Voalte preferred. Otherwise text to cell [phone] or call my office [phone]  __________________________________________________________________  Patient:Ray Myers   MRN:[id number]   CSN:[id number]

## 2022-03-22 DIAGNOSIS — R19.07 GENERALIZED INTRA-ABDOMINAL AND PELVIC SWELLING, MASS AND LUMP: ICD-10-CM

## 2022-03-22 DIAGNOSIS — I71.30 RUPTURED ABDOMINAL AORTIC ANEURYSM (AAA) (HCC): ICD-10-CM

## 2022-04-14 ENCOUNTER — DOCUMENTATION (OUTPATIENT)
Dept: VASCULAR LAB | Facility: MEDICAL CENTER | Age: 67
End: 2022-04-14
Payer: MEDICARE

## 2022-04-14 NOTE — PROGRESS NOTES
"Spoke with patient and patients niece to remind the patient that he is due for his 1 mo scan post aneurysm repair. Also let them know we have a referral for medical management of his aneurysm repair. Patient states that he does not have any transportation to get to [place] for follow up, per Mary ARDON, patient ok to be seen virtually. patient and niece agree. Scheduled NP appt with Dr. Bloch on 7/27/22 for Facetime.     Patient and niece state that they were to have a post op appt with Dr. Manley virtually however were unsuccessful as they \"sat on hold for 2 hours and then hung up\". Let them know I would reach out to their office so they can get rescheduled.     Called [facility], they will reach out to [facility] to get this appt rescheduled.     Mailed out appt reminder card along with imaging order and an information pamphlet regarding abdominal aneurysm repairs.       "

## 2022-05-12 ENCOUNTER — HOSPITAL ENCOUNTER (OUTPATIENT)
Dept: RADIOLOGY | Facility: MEDICAL CENTER | Age: 67
End: 2022-05-12
Payer: MEDICARE

## 2022-05-13 ENCOUNTER — DOCUMENTATION (OUTPATIENT)
Dept: VASCULAR LAB | Facility: MEDICAL CENTER | Age: 67
End: 2022-05-13
Payer: MEDICARE

## 2022-05-13 NOTE — PROGRESS NOTES
Imaging reviewed in accordance with institution EVAR guideline  This imaging performed approx 2 mo after procedure    Review of CTA demonstrates stable endograft without endoleak.retroperitoneal hem improved.   There is a small unrepaired suprarenal AAA that is stable     Await initial visit in vascular medicine clinic as scheduled.     Anticipate repeat imaging at one year in accordance with institution guideline. Given complexity of presentation will opt for CTA at one year rather than u/s    Michael Bloch, MD  Vascular Care:

## 2022-07-27 ENCOUNTER — OFFICE VISIT (OUTPATIENT)
Dept: VASCULAR LAB | Facility: MEDICAL CENTER | Age: 67
End: 2022-07-27
Attending: INTERNAL MEDICINE
Payer: MEDICARE

## 2022-07-27 DIAGNOSIS — F17.200 SMOKING: ICD-10-CM

## 2022-07-27 DIAGNOSIS — I71.30 RUPTURED ABDOMINAL AORTIC ANEURYSM (AAA) (HCC): ICD-10-CM

## 2022-07-27 PROCEDURE — 99203 OFFICE O/P NEW LOW 30 MIN: CPT | Performed by: INTERNAL MEDICINE

## 2022-07-27 RX ORDER — ASPIRIN 81 MG/1
81 TABLET, CHEWABLE ORAL DAILY
Qty: 100 TABLET | Refills: 11 | Status: SHIPPED | OUTPATIENT
Start: 2022-07-27 | End: 2023-09-05

## 2022-07-27 NOTE — PROGRESS NOTES
VASCULAR MEDICINE CLINIC - INITIAL VISIT  07/27/22     Ray Myers is a 66 y.o. male who presents today  for vascular care    This visit was conducted via [employer] video call using secure and encrypted video conferencing technology to reduce spread of and/or potential exposures to COVID.  The patient was in a private location (home) in the state [facility].    The patient's identity was confirmed and verbal consent was obtained for this virtual visit.     Subjective      HPI:  Patient referred for evaluation and management of AAA.  Patient presented with ruptured abdominal aortic aneurysm in March 2022.  He was transferred to [facility].  He underwent emergent endovascular stent graft.  His postoperative course was uneventful.  He states has been doing well since he was discharged.  He had a CT scan as per below.  Denies any belly pain.  Denies claudication.  Denies back pain.  Denies any history of heart attack stroke or other vascular disease.  He says his blood pressures been a little bit high in the past but is never been on blood pressure medications.  He does have some sort of visiting nurse service that comes out to see him most days and they can measure his blood pressure.  He states that he is never been told his cholesterol was high.  Is never been on cholesterol-lowering medicine.  He denies diabetes.  He is not currently taking aspirin.  He continues to smoke a pack a day    Past medical history -abdominal aortic aneurysm    Past Surgical History:   Procedure Laterality Date   • ABDOMINAL AORTIC ANEURYSM  3/17/2022    Procedure: EXCISION OR REPAIR, AAA ENDOVASCULAR;  Surgeon: Andrew Manley M.D.;  Location: SURGERY [facility];  Service: Vascular        Family history-noncontributory    Social History     Tobacco Use   • Smoking status: Current Some Day Smoker   • Smokeless tobacco: Never Used   Substance Use Topics   • Alcohol use: Yes   • Drug use: Yes        No current outpatient medications on file  prior to visit.     No current facility-administered medications on file prior to visit.        ALLERGIES  Patient has no known allergies.     DIET AND EXERCISE:  Weight Change: Stable  Diet: common adult  Exercise: sporadic irregular exercise             Objective     Objective:     There were no vitals filed for this visit.     Physical Exam  Constitutional:       General: He is not in acute distress.     Appearance: He is not diaphoretic.   HENT:      Head: Normocephalic and atraumatic.   Eyes:      General: No scleral icterus.     Conjunctiva/sclera: Conjunctivae normal.   Pulmonary:      Effort: Pulmonary effort is normal. No respiratory distress.   Skin:     Coloration: Skin is not pale.   Neurological:      Mental Status: He is alert and oriented to person, place, and time.      Cranial Nerves: No cranial nerve deficit.   Psychiatric:         Mood and Affect: Affect normal.         Judgment: Judgment normal.          DATA REVIEW    No results found for: CHOLSTRLTOT, LDL, HDL, TRIGLYCERIDE    Lab Results   Component Value Date/Time    SODIUM 140 03/19/2022 05:40 AM    POTASSIUM 4.0 03/19/2022 05:40 AM    CHLORIDE 109 03/19/2022 05:40 AM    CO2 23 03/19/2022 05:40 AM    GLUCOSE 98 03/19/2022 05:40 AM    BUN 14 03/19/2022 05:40 AM    CREATININE 0.71 03/19/2022 05:40 AM     Lab Results   Component Value Date/Time    ALKPHOSPHAT 79 03/17/2022 11:55 AM    ASTSGOT 8 (L) 03/17/2022 11:55 AM    ALTSGPT 6 03/17/2022 11:55 AM    TBILIRUBIN 0.7 03/17/2022 11:55 AM       No results found for: HBA1C    No results found for: MICROALBCALC, MALBCRT, MALBEXCR, VFCEBJ69, MICROALBUR, MICRALB, UMICROALBUM, MICROALBTIM      CTA abdomen pelvis May 2022 at [facility]  Status post endovascular stenting of large fusiform aneurysm infrarenal abdominal aorta aortic stent and iliac artery  Residual aneurysm sac 7 x 6 cm slightly decreased in size from previous  No evidence of endoleak or recurrent rupture  Near complete  resolution of hematoma  Stable aneurysm dilation suprarenal abdominal aorta 3.2 x 3 cm      Medical Decision Making:  Today's Assessment / Status / Plan:     1. Ruptured abdominal aortic aneurysm (AAA) (HCC)  aspirin (ASA) 81 MG Chew Tab chewable tablet   2. Smoking          Patient Type: Secondary Prevention    Etiology of Established CVD if Present:     1.  Abdominal aortic aneurysm with rupture status post EVAR March 2022 -no endoleak and EVAR appears stable on CTA 2 months after procedure.  His aneurysm sac was fairly large at presentation and there was residual suprarenal aneurysm.  We discussed the need for lifelong surveillance and the link between abdominal aortic aneurysm and risk of MI and stroke  -Repeat CTA at [facility] March 2023  -Medical management as per below    Lipid Management: Qualifies for Statin Therapy Based on 2018 ACC/AHA Guidelines: yes  Calculated 10-Year Risk of ASCVD: N/A  Currently on Statin: no  No lipid panel available  Discussed the fact that AAA is a form of ASCVD and indication for statin therapy.   Despite my recommendation patient declines statin therapy and declines to have a fasting lipid panel drawn  Will reevaluate at follow-up visit    Blood Pressure Management:  [place] (2017) Blood Pressure Goal <130/80  Patient states he has a history of hypertension but has never been on blood pressure medications  I encouraged him to have his visiting nurses begin to keep a written blood pressure log for us that we can review at his follow-up visit we will consider antihypertensive therapy if above 130/80 on average    Glycemic Status: Normal    Anti-Platelet/Anti-Coagulant Tx: Recommend he start aspirin 81 mg a day    Smoking: Current pack per day smoker  Discussed the fact that cigarette smoking increases the risk of need for intervention after abdominal aortic aneurysm repair.  Also increases the risk of heart attack or stroke which could decrease his ability to maintain his  independence  He is willing to consider quitting or cutting down at the present time  Will continue to address at every visit    Physical Activity: Encouraged regular physical activity    Weight Management and Nutrition: We will need to be addressed at future visit        Instructed to follow-up with PCP for remainder of adult medical needs: yes  We will partner with other providers in the management of established vascular disease and cardiometabolic risk factors.    Studies to Be Obtained: CTA abd/pelvis march 2023  Labs to Be Obtained: refused    Follow up in: 6 weeks-virtual visit by patient past    Michael J Bloch, M.D.     Cc:  BISI Manley

## 2022-09-20 ENCOUNTER — OFFICE VISIT (OUTPATIENT)
Dept: VASCULAR LAB | Facility: MEDICAL CENTER | Age: 67
End: 2022-09-20
Attending: NURSE PRACTITIONER
Payer: MEDICARE

## 2022-09-20 DIAGNOSIS — I71.30 RUPTURED ABDOMINAL AORTIC ANEURYSM (AAA) (HCC): ICD-10-CM

## 2022-09-20 PROCEDURE — 99215 OFFICE O/P EST HI 40 MIN: CPT | Performed by: NURSE PRACTITIONER

## 2022-09-20 NOTE — PROGRESS NOTES
VASCULAR MEDICINE CLINIC - FOLLOW UP VISIT  9/20/22     Ray Myers is a 66 y.o. male who presents today  for vascular care    This evaluation was conducted via  [employer]  using secure and encrypted videoconferencing technology. The patient was in their home in the [facility].    The patient's identity was confirmed and verbal consent was obtained for this virtual visit.      Subjective      HPI:  Patient returns for evaluation and management of EVAR s/p ruptured abdominal aortic aneurysm 3/2022  His family member, I believe his daughter, accompanies him during this visit  She reports that his home blood pressures have been very high: 150-190/90-100s  He has never been on any antihypertensives  He denies any chest pain, SOB, LE swelling  No headaches or vision changes  No back or abd pain  He did start asa as recommended at last visit; no bleeding concerns  He is on no other medications  He refuses lab draws as he has an extreme fear of needles  Lives ~2hrs away; willing to have imaging done at [facility]; vas sx recommends u/s in Dec  He continues to smoke ~ 1ppd      Past Surgical History:   Procedure Laterality Date    ABDOMINAL AORTIC ANEURYSM  3/17/2022    Procedure: EXCISION OR REPAIR, AAA ENDOVASCULAR;  Surgeon: Andrew Manley M.D.;  Location: SURGERY [facility];  Service: Vascular        Family history-noncontributory    Social History     Tobacco Use    Smoking status: Some Days    Smokeless tobacco: Never   Substance Use Topics    Alcohol use: Yes    Drug use: Yes        Current Outpatient Medications on File Prior to Visit   Medication Sig Dispense Refill    aspirin (ASA) 81 MG Chew Tab chewable tablet Chew 1 Tablet every day. 100 Tablet 11     No current facility-administered medications on file prior to visit.        ALLERGIES  Patient has no known allergies.     DIET AND EXERCISE:  Weight Change: unable to assess  Diet: common adult  Exercise: minimal exercise- pt requests rx for  "electric scooter today.  When asked why he is unable to walk he states, \"because I am old.\"          Objective     Objective:     There were no vitals filed for this visit.     Physical Exam  Constitutional:       General: He is not in acute distress.     Appearance: He is not diaphoretic.   Pulmonary:      Effort: Pulmonary effort is normal.   Skin:     Coloration: Skin is not pale.   Neurological:      Mental Status: He is alert and oriented to person, place, and time.   Psychiatric:         Mood and Affect: Mood and affect normal.         Behavior: Behavior normal.        DATA REVIEW    No results found for: CHOLSTRLTOT, LDL, HDL, TRIGLYCERIDE    Lab Results   Component Value Date/Time    SODIUM 140 03/19/2022 05:40 AM    POTASSIUM 4.0 03/19/2022 05:40 AM    CHLORIDE 109 03/19/2022 05:40 AM    CO2 23 03/19/2022 05:40 AM    GLUCOSE 98 03/19/2022 05:40 AM    BUN 14 03/19/2022 05:40 AM    CREATININE 0.71 03/19/2022 05:40 AM     Lab Results   Component Value Date/Time    ALKPHOSPHAT 79 03/17/2022 11:55 AM    ASTSGOT 8 (L) 03/17/2022 11:55 AM    ALTSGPT 6 03/17/2022 11:55 AM    TBILIRUBIN 0.7 03/17/2022 11:55 AM       No results found for: HBA1C    No results found for: MICROALBCALC, MALBCRT, MALBEXCR, [id number], MICROALBUR, MICRALB, UMICROALBUM, MICROALBTIM      CTA abdomen pelvis May 2022 at [facility]  Status post endovascular stenting of large fusiform aneurysm infrarenal abdominal aorta aortic stent and iliac artery  Residual aneurysm sac 7 x 6 cm slightly decreased in size from previous  No evidence of endoleak or recurrent rupture  Near complete resolution of hematoma  Stable aneurysm dilation suprarenal abdominal aorta 3.2 x 3 cm      Medical Decision Making:  Today's Assessment / Status / Plan:     1. Ruptured abdominal aortic aneurysm (AAA) (HCC)  US-AORTA/ILIACS DUPLEX COMPLETE           Patient Type: Secondary Prevention    Etiology of Established CVD if Present:     1.  Abdominal aortic aneurysm " with rupture status post EVAR March 2022 -no endoleak and EVAR appears stable on CTA 2 months after procedure.  His aneurysm sac was fairly large at presentation and there was residual suprarenal aneurysm.  We again discussed the need for lifelong surveillance and the link between abdominal aortic aneurysm and risk of MI and stroke  -u/s aorta 12/2022, as recommended by vasc sx; sent imaging order today  -Medical management as per below    Lipid Management: Qualifies for Statin Therapy Based on 2018 ACC/AHA Guidelines: yes  Calculated 10-Year Risk of ASCVD: N/A  Currently on Statin: no  No lipid panel available  Discussed the fact that AAA is a form of ASCVD and indication for statin therapy.   Despite our continued recommendation, patient declines statin therapy and declines to have a fasting lipid panel drawn d/t his intense fear of needles.  Will re-evaluate at follow-up, face to face visit    Blood Pressure Management:  Acc/aha (2017) Blood Pressure Goal <130/80  Blood pressures very high at home, per daughter.  Apparently a home health RN comes a few times/wk to take his readings  Most home BP readings have been 150-190/90-100s  We discussed, at length, his increased risk of abdominal rupture/dissection, heart attack and stroke given his untreated high blood pressure.  He refuses blood pressure medications at this time and states that he understands and accepts this risk.  We discussed that this risk can be death or potentially disabling stroke etc.  - encouraged pt and pt family to discuss BP treatment as pt's refusal of treatment potentially affects them too  - readdress at face to face visit    Glycemic Status: Normal    Anti-Platelet/Anti-Coagulant Tx: Continue aspirin 81 mg a day    Smoking: Current pack per day smoker  Previously discussed the fact that cigarette smoking increases the risk of need for intervention after abdominal aortic aneurysm repair.  Also increases the risk of heart attack or stroke  which could decrease his ability to maintain his independence  He remains unwilling to consider quitting or cutting down at the present time  - continue to address at every visit    Physical Activity: Encouraged regular physical activity.  Currently, there is no known reason why he is unable to walk, therefore, denied his request for electric scooter.  He needs to est with PCP to discuss further and needs a physical assessment, in person, in our office.    Weight Management and Nutrition: We will need to be addressed at future visit        Instructed to follow-up with PCP for remainder of adult medical needs: yes  We will partner with other providers in the management of established vascular disease and cardiometabolic risk factors.    Studies to Be Obtained: u/s aorta 12/2022- ordered today  Labs to Be Obtained: refused    Follow up in: 3mo, after imaging    DUGLAS Harrell  [facility]      Cc:  BISI Manley MD

## 2023-01-05 ENCOUNTER — DOCUMENTATION (OUTPATIENT)
Dept: VASCULAR LAB | Facility: MEDICAL CENTER | Age: 68
End: 2023-01-05
Payer: MEDICARE

## 2023-01-06 NOTE — PROGRESS NOTES
Imaging reviewed in accordance with institution TEVAR/EVAR guideline  This imaging performed approx 9 mo after procedure    Review of aortoiliac duplex report from outside institution demonstrates stable endograft without noted endoleak. No increase in reported dimension of residual sac    Will d/w patient at f/u visit    Anticipate repeat imaging with aortoiliac duplex in one year accordance with institution guideline    Michael Bloch, MD  Vascular Care:

## 2023-01-13 ENCOUNTER — TELEMEDICINE (OUTPATIENT)
Dept: VASCULAR LAB | Facility: MEDICAL CENTER | Age: 68
End: 2023-01-13
Attending: FAMILY MEDICINE
Payer: MEDICARE

## 2023-01-13 DIAGNOSIS — Z86.79 STATUS POST ENDOVASCULAR ANEURYSM REPAIR (EVAR): ICD-10-CM

## 2023-01-13 DIAGNOSIS — F17.218 CIGARETTE NICOTINE DEPENDENCE WITH OTHER NICOTINE-INDUCED DISORDER: ICD-10-CM

## 2023-01-13 DIAGNOSIS — Z79.02 LONG TERM (CURRENT) USE OF ANTITHROMBOTICS/ANTIPLATELETS: Chronic | ICD-10-CM

## 2023-01-13 DIAGNOSIS — F10.10 ALCOHOL ABUSE: ICD-10-CM

## 2023-01-13 DIAGNOSIS — Z72.0 TOBACCO ABUSE: ICD-10-CM

## 2023-01-13 DIAGNOSIS — Z98.890 STATUS POST ENDOVASCULAR ANEURYSM REPAIR (EVAR): ICD-10-CM

## 2023-01-13 DIAGNOSIS — E78.5 DYSLIPIDEMIA: ICD-10-CM

## 2023-01-13 DIAGNOSIS — I10 PRIMARY HYPERTENSION: ICD-10-CM

## 2023-01-13 DIAGNOSIS — I71.33 RUPTURED INFRARENAL ABDOMINAL AORTIC ANEURYSM (AAA) (HCC): ICD-10-CM

## 2023-01-13 PROCEDURE — 99214 OFFICE O/P EST MOD 30 MIN: CPT | Mod: 95 | Performed by: FAMILY MEDICINE

## 2023-01-13 ASSESSMENT — ENCOUNTER SYMPTOMS
COUGH: 0
WHEEZING: 0
ORTHOPNEA: 0
SPUTUM PRODUCTION: 0
FEVER: 0
SHORTNESS OF BREATH: 0
PALPITATIONS: 0
PND: 0
HEMOPTYSIS: 0
CHILLS: 0
CLAUDICATION: 0

## 2023-01-13 NOTE — PROGRESS NOTES
"This visit was conducted via [email or web address] video  using secure and encrypted video conferencing technology to reduce spread of and/or potential exposures to COVID.  The patient was in a private location (home) in the state [facility].    The patient's identity was confirmed and verbal consent was obtained for this virtual visit.     VASCULAR MEDICINE CLINIC - FOLLOW UP VISIT  01/13/23     Ray Myers is a 66 y.o. male who presents today  for vascular care  Patient returns for evaluation and management of EVAR s/p ruptured abdominal aortic aneurysm 3/2022    Subjective      Interval hx/concerns: accompanied by Daughter during visit     AAA, s/p EVAR 3/2022 (Dr. Manley)  No new concerns.     HTN:  No interval concerns, tolerating meds, no sx.   Home BP log: 160-170s / 80-90s   Adherence to current HTN meds: compliant all of the time     Antithrombotic therapy: ASA 81mg , no bleeding/bruising reported       He continues to smoke ~ 1ppd    Hyperlipidemia:   No recent labs, has declined lab draws due to fear of needles  Current treatment: lifestyle changes  and no current medications       Current Outpatient Medications:     aspirin, 81 mg, Oral, DAILY        Social History     Tobacco Use    Smoking status: Some Days    Smokeless tobacco: Never   Substance Use Topics    Alcohol use: Yes    Drug use: Yes      DIET AND EXERCISE:  Weight Change: unable to assess  Diet: common adult  Exercise: minimal exercise- pt requests rx for electric scooter today.  When asked why he is unable to walk he states, \"because I am old.\"    Review of Systems   Constitutional:  Negative for chills, fever and malaise/fatigue.   Respiratory:  Negative for cough, hemoptysis, sputum production, shortness of breath and wheezing.    Cardiovascular:  Negative for chest pain, palpitations, orthopnea, claudication, leg swelling and PND.          Objective      There were no vitals filed for this visit.   BP Readings from Last 5 Encounters: "   03/19/22 139/65        Physical Exam  Constitutional:       General: He is not in acute distress.     Appearance: He is not diaphoretic.   Pulmonary:      Effort: Pulmonary effort is normal.   Skin:     Coloration: Skin is not pale.   Neurological:      Mental Status: He is alert and oriented to person, place, and time.   Psychiatric:         Mood and Affect: Mood and affect normal.         Behavior: Behavior normal.        DATA REVIEW    No results found for: CHOLSTRLTOT, LDL, HDL, TRIGLYCERIDE    Lab Results   Component Value Date/Time    SODIUM 140 03/19/2022 05:40 AM    POTASSIUM 4.0 03/19/2022 05:40 AM    CHLORIDE 109 03/19/2022 05:40 AM    CO2 23 03/19/2022 05:40 AM    GLUCOSE 98 03/19/2022 05:40 AM    BUN 14 03/19/2022 05:40 AM    CREATININE 0.71 03/19/2022 05:40 AM     Lab Results   Component Value Date/Time    ALKPHOSPHAT 79 03/17/2022 11:55 AM    ASTSGOT 8 (L) 03/17/2022 11:55 AM    ALTSGPT 6 03/17/2022 11:55 AM    TBILIRUBIN 0.7 03/17/2022 11:55 AM       No results found for: HBA1C    No results found for: MICROALBCALC, MALBCRT, MALBEXCR, [id number], MICROALBUR, MICRALB, UMICROALBUM, MICROALBTIM      [facility] abdomen pelvis May 2022 at [facility]  Status post endovascular stenting of large fusiform aneurysm infrarenal abdominal aorta aortic stent and iliac artery  Residual aneurysm sac 7 x 6 cm slightly decreased in size from previous  No evidence of endoleak or recurrent rupture  Near complete resolution of hematoma  Stable aneurysm dilation suprarenal abdominal aorta 3.2 x 3 cm    Ao/iliac duplex 12/13/22 ([facility])            Medical Decision Making:  Today's Assessment / Status / Plan:     1. Ruptured infrarenal abdominal aortic aneurysm (AAA)        2. Alcohol abuse        3. Tobacco abuse        4. Status post endovascular aneurysm repair (EVAR)        5. Dyslipidemia        6. Primary hypertension        7. Long term (current) use of antithrombotics/antiplatelets        8. Cigarette  nicotine dependence with other nicotine-induced disorder          Patient Type: Secondary Prevention    Etiology of Established CVD if Present:     1. Abdominal aortic aneurysm with rupture status post EVAR March 2022   - stable per duplex 12/2022 with residual sac from 7cm to 5.9cm   -no endoleak and EVAR appears stable on CTA 2 months after procedure.    His aneurysm sac was fairly large at presentation and there was residual suprarenal aneurysm.    We again discussed the need for lifelong surveillance and the link between abdominal aortic aneurysm and risk of MI and stroke  - continue secondary prevention med mgmt   - repeat duplex annually (Dec) for 2 yrs, then Q2yr thereafter if stable - will complete [facility]     Lipid Management:   Qualifies for Statin Therapy Based on 2018 ACC/AHA Guidelines: yes  Calculated 10-Year Risk of ASCVD: N/A  Currently on Statin: no  No lipid panel available  Discussed the fact that AAA is a form of ASCVD and indication for statin therapy.   Despite our continued recommendation, patient declines statin therapy and declines to have a fasting lipid panel drawn d/t his intense fear of needles.   Tx goal: LDL-C <70   At goal: n/a - unable to obtain labs per pt preference   - continue TLC measures   - revisit statin therapy at every visit - continue to declines statin despite review of benefits > risks     Blood Pressure Management:  Acc/aha (2017) Blood Pressure Goal <130/80  At goal at home? No   At office? N/a   We discussed, at length, his increased risk of abdominal rupture/dissection, heart attack and stroke given his untreated high blood pressure.  He refuses blood pressure medications at this time and states that he understands and accepts this risk.  We discussed that this risk can be death or potentially disabling stroke etc.  - encouraged pt and pt family to discuss BP treatment as pt's refusal of treatment potentially affects them too  - recommended for ARB and  amlodipine but declines despite review of benefits > risks of meds and clear need to reduce arterial HTN to maintain stent patency and reduce risk of other CVD - pt verbalizes understanding of risks and declines therapy     Glycemic Status: Normal    Anti-Platelet/Anti-Coagulant Tx: Continue aspirin 81 mg a day    Smoking: Stages of Change: Precontemplative  (no intention to change in next 6 months)  currently smokes 1ppd   Previously discussed the fact that cigarette smoking increases the risk of need for intervention after abdominal aortic aneurysm repair.  Also increases the risk of heart attack or stroke which could decrease his ability to maintain his independence  He remains unwilling to consider quitting or cutting down at the present time  Provided strong recommendation for complete cessation and informed this is the primary contributor to the majority of all ASCVD and cancer-related conditions and can result in significant morbidity and early mortality.   - reviewed resources for cessation including tobacco cessation clinic visit, pharmacotx meds, quit lines  - review at every visit   Provided 3 minutes of face-to-face counseling on above topics     Physical Activity: Encouraged regular physical activity.  Currently, there is no known reason why he is unable to walk, therefore, denied his request for electric scooter.  He needs to est with PCP to discuss further and needs a physical assessment, in person, in our office.    Weight Management and Nutrition: Med diet encouraged     Other:     # hx of Etoh abuse - advised for ongoing cessation efforts with complete abstinence best     Instructed to follow-up with PCP for remainder of adult medical needs: yes  We will partner with other providers in the management of established vascular disease and cardiometabolic risk factors.    Studies to Be Obtained: Ao/iliac duplex - 12/2023 ([facility]) - not ordered, aprn to track  Labs to Be Obtained:  refused    Follow up in:  12/2023, sooner cesar Waldrop M.D.  Vascular Medicine Clinic   [facility]   [phone]

## 2023-09-03 DIAGNOSIS — I71.30 RUPTURED ABDOMINAL AORTIC ANEURYSM (AAA) (HCC): ICD-10-CM

## 2023-09-05 RX ORDER — ASPIRIN 81 MG
81 TABLET,CHEWABLE ORAL DAILY
Qty: 100 TABLET | Refills: 0 | Status: SHIPPED | OUTPATIENT
Start: 2023-09-05 | End: 2024-01-26

## 2023-09-18 ENCOUNTER — DOCUMENTATION (OUTPATIENT)
Dept: VASCULAR LAB | Facility: MEDICAL CENTER | Age: 68
End: 2023-09-18
Payer: MEDICARE

## 2023-09-18 DIAGNOSIS — Z86.79 STATUS POST ENDOVASCULAR ANEURYSM REPAIR (EVAR): ICD-10-CM

## 2023-09-18 DIAGNOSIS — Z98.890 STATUS POST ENDOVASCULAR ANEURYSM REPAIR (EVAR): ICD-10-CM

## 2023-09-18 NOTE — PROGRESS NOTES
Left message for patient to let him know he is due for surveillance imaging in December however, ultrasounds are booking out a few months so pt should call not to schedule. Phone number to imaging scheduling given on  as well as our direct office line incase he needs to use a facility outside of [facility].       Sin Chavez, Medical Assistant   [facility] Medicine   Ph: [phone]  Fx: [phone]

## 2023-09-18 NOTE — PROGRESS NOTES
"\"Studies to Be Obtained: Ao/iliac duplex - 12/2023 ([facility])\"    Orders placed for vascular surveillance imaging.    Message to MA team to notify pt of imaging due.       Wendy Aguilar R.N.   [facility]   "

## 2023-09-27 ENCOUNTER — DOCUMENTATION (OUTPATIENT)
Dept: VASCULAR LAB | Facility: MEDICAL CENTER | Age: 68
End: 2023-09-27
Payer: MEDICARE

## 2023-09-27 NOTE — PROGRESS NOTES
Left message for patient to let him know he is due for surveillance imaging in December however, ultrasounds are booking out a few months so pt should call not to schedule. Phone number to imaging scheduling given on  as well as our direct office line incase he needs to use a facility outside of [facility].         Sin Chavez, Medical Assistant   [facility] Vascular Medicine   Ph: [phone]  Fx: [phone]

## 2023-09-28 ENCOUNTER — DOCUMENTATION (OUTPATIENT)
Dept: VASCULAR LAB | Facility: MEDICAL CENTER | Age: 68
End: 2023-09-28
Payer: MEDICARE

## 2023-09-28 NOTE — PROGRESS NOTES
Faxed U/S aorta order to [facility] (fx#[phone]). Per request from patient.    Rachael Bettencourt, Med Ass't  [facility]  Ph. [phone]  Fx. [phone]

## 2023-12-19 ENCOUNTER — TELEPHONE (OUTPATIENT)
Dept: VASCULAR LAB | Facility: MEDICAL CENTER | Age: 68
End: 2023-12-19
Payer: MEDICARE

## 2023-12-19 NOTE — TELEPHONE ENCOUNTER
S/w niece, unfortunately she is not approved in his chart as discussing treatment. Asked to have pt call back and authorize can review his treatment.    Need to remind them that pt is due for their surveillance vascular imaging.

## 2023-12-20 ENCOUNTER — DOCUMENTATION (OUTPATIENT)
Dept: VASCULAR LAB | Facility: MEDICAL CENTER | Age: 68
End: 2023-12-20
Payer: MEDICARE

## 2023-12-21 NOTE — PROGRESS NOTES
Imaging reviewed in accordance with institution TEVAR/EVAR guideline    This imaging performed approx 18 mo after procedure in October 2023    Review of aortoiliac duplex demonstrates stable endograft without endoleak.    Will discuss medical management with patient at follow-up visit    Anticipate repeat imaging with aortoiliac duplex in 1 year (October 2024) in accordance with institution guideline    Michael Bloch, MD  Vascular Care:        
Surveillance calendar updated. 12/21/2023       
Alert/Awake

## 2023-12-22 ENCOUNTER — DOCUMENTATION (OUTPATIENT)
Dept: VASCULAR LAB | Facility: MEDICAL CENTER | Age: 68
End: 2023-12-22
Payer: MEDICARE

## 2023-12-22 NOTE — Clinical Note
Pt did not respond to [facility] virtual appt.  Did not answer when tried to call pt. Will need to be rescheduled.

## 2023-12-22 NOTE — PROGRESS NOTES
Unable to connect for virtual appt with pt today via [employer].    Attempted to call pt, no answer.    Will have scheduling staff reach out to pt to reschedule.      Lady HEREDIA  [facility]

## 2023-12-22 NOTE — PROGRESS NOTES
Called patient to reschedule missed vascular appt. Unable to reach patient or leave vm as vm box is full.       Sin Chavez, Medical Assistant   [facility]   Ph: [phone]  Fx: [phone]

## 2024-01-26 DIAGNOSIS — I71.30 RUPTURED ABDOMINAL AORTIC ANEURYSM (AAA) (HCC): ICD-10-CM

## 2024-01-26 RX ORDER — ASPIRIN 81 MG
81 TABLET,CHEWABLE ORAL DAILY
Qty: 100 TABLET | Refills: 3 | Status: SHIPPED | OUTPATIENT
Start: 2024-01-26

## 2024-02-23 DIAGNOSIS — Z86.79 STATUS POST ENDOVASCULAR ANEURYSM REPAIR (EVAR): ICD-10-CM

## 2024-02-23 DIAGNOSIS — Z98.890 STATUS POST ENDOVASCULAR ANEURYSM REPAIR (EVAR): ICD-10-CM

## 2024-03-04 ENCOUNTER — DOCUMENTATION (OUTPATIENT)
Dept: VASCULAR LAB | Facility: MEDICAL CENTER | Age: 69
End: 2024-03-04
Payer: MEDICARE

## 2024-03-05 NOTE — PROGRESS NOTES
Patient no-show for follow-up visit  Will ask MA to call and reschedule    Michael J. Bloch, MD  Vascular Care

## 2024-03-07 ENCOUNTER — DOCUMENTATION (OUTPATIENT)
Dept: VASCULAR LAB | Facility: MEDICAL CENTER | Age: 69
End: 2024-03-07
Payer: MEDICARE

## 2024-03-07 NOTE — PROGRESS NOTES
Left message for patient to call back and schedule follow up appt with apn. Patient missed last appt.       Sin Chavez, Medical Assistant   [facility]   Ph: [phone]  Fx: [phone]

## 2024-05-24 ENCOUNTER — TELEMEDICINE (OUTPATIENT)
Dept: VASCULAR LAB | Facility: MEDICAL CENTER | Age: 69
End: 2024-05-24
Payer: MEDICARE

## 2024-05-24 DIAGNOSIS — Z72.0 TOBACCO ABUSE: ICD-10-CM

## 2024-05-24 DIAGNOSIS — Z98.890 STATUS POST ENDOVASCULAR ANEURYSM REPAIR (EVAR): ICD-10-CM

## 2024-05-24 DIAGNOSIS — F17.218 CIGARETTE NICOTINE DEPENDENCE WITH OTHER NICOTINE-INDUCED DISORDER: ICD-10-CM

## 2024-05-24 DIAGNOSIS — Z86.79 STATUS POST ENDOVASCULAR ANEURYSM REPAIR (EVAR): ICD-10-CM

## 2024-05-24 DIAGNOSIS — E78.5 DYSLIPIDEMIA: ICD-10-CM

## 2024-05-24 DIAGNOSIS — I71.33 RUPTURED INFRARENAL ABDOMINAL AORTIC ANEURYSM (AAA) (HCC): ICD-10-CM

## 2024-05-24 DIAGNOSIS — F17.200 SMOKING: ICD-10-CM

## 2024-05-24 DIAGNOSIS — I10 PRIMARY HYPERTENSION: ICD-10-CM

## 2024-05-24 DIAGNOSIS — Z79.02 LONG TERM (CURRENT) USE OF ANTITHROMBOTICS/ANTIPLATELETS: ICD-10-CM

## 2024-05-24 ASSESSMENT — ENCOUNTER SYMPTOMS
HEMOPTYSIS: 0
FEVER: 0
BRUISES/BLEEDS EASILY: 0
HEADACHES: 0
DIZZINESS: 0
PALPITATIONS: 0
CLAUDICATION: 0
CHILLS: 0
BLOOD IN STOOL: 0
SHORTNESS OF BREATH: 0
COUGH: 0

## 2024-05-24 NOTE — PROGRESS NOTES
"This evaluation was conducted via Zoom using secure and encrypted videoconferencing technology. The patient was in their home in the [facility].    The patient's identity was confirmed and verbal consent was obtained for this virtual visit.      VASCULAR MEDICINE CLINIC - FOLLOW UP VISIT  05/24/2024     Ray Myers is a 68 y.o. male who presents today  for vascular care  Patient returns for evaluation and management of EVAR s/p ruptured abdominal aortic aneurysm 3/2022    Subjective    LAST SEEN 1/13/2023  Has missed/failed to connect to multiple appts since last seen    Interval hx/concerns: accompanied by niece during visit   Denies CP, SOB, palpitations, or leg swelling  No back/abd pain, no claudication symptoms    AAA, s/p EVAR 3/2022 (Dr. Manley)  No new concerns.   Had imaging done 10/2023 at [facility]    HTN:  No interval concerns, no bp meds - refusing,  no sx.   Home BP log: not checking.  \"Always high\" in other offices   Adherence to current HTN meds: declines     Antithrombotic therapy:   ASA 81mg ,   no bleeding/bruising reported       He continues to smoke ~ a little less than 1ppd    Hyperlipidemia:   No recent labs, has declined lab draws due to fear of needles  Current treatment: lifestyle changes  and no current medications       Current Outpatient Medications:     Aspirin Low Dose, 81 mg, Oral, DAILY        Social History     Tobacco Use    Smoking status: Some Days    Smokeless tobacco: Never   Substance Use Topics    Alcohol use: Yes    Drug use: Yes      DIET AND EXERCISE:  Weight Change: unable to assess  Diet: common adult, but decreased appetite recently  Exercise: minimal exercise- \"don't get around much\"     Review of Systems   Constitutional:  Negative for chills, fever and malaise/fatigue.   HENT:  Negative for nosebleeds.    Respiratory:  Negative for cough, hemoptysis and shortness of breath.    Cardiovascular:  Negative for chest pain, palpitations, claudication and leg " "swelling.   Gastrointestinal:  Negative for blood in stool and melena.   Genitourinary:  Negative for hematuria.   Neurological:  Negative for dizziness and headaches.   Endo/Heme/Allergies:  Does not bruise/bleed easily.            Objective      There were no vitals filed for this visit.   BP Readings from Last 5 Encounters:   03/19/22 139/65        Physical Exam  Constitutional:       General: He is not in acute distress.     Appearance: He is not diaphoretic.   HENT:      Head: Normocephalic and atraumatic.   Pulmonary:      Effort: Pulmonary effort is normal. No respiratory distress.   Skin:     Coloration: Skin is not pale.   Neurological:      Mental Status: He is alert and oriented to person, place, and time.   Psychiatric:         Mood and Affect: Mood and affect normal.         Behavior: Behavior normal.          DATA REVIEW    No results found for: \"CHOLSTRLTOT\", \"LDL\", \"HDL\", \"TRIGLYCERIDE\"    Lab Results   Component Value Date/Time    SODIUM 140 03/19/2022 05:40 AM    POTASSIUM 4.0 03/19/2022 05:40 AM    CHLORIDE 109 03/19/2022 05:40 AM    CO2 23 03/19/2022 05:40 AM    GLUCOSE 98 03/19/2022 05:40 AM    BUN 14 03/19/2022 05:40 AM    CREATININE 0.71 03/19/2022 05:40 AM     Lab Results   Component Value Date/Time    ALKPHOSPHAT 79 03/17/2022 11:55 AM    ASTSGOT 8 (L) 03/17/2022 11:55 AM    ALTSGPT 6 03/17/2022 11:55 AM    TBILIRUBIN 0.7 03/17/2022 11:55 AM       No results found for: \"HBA1C\"    No results found for: \"MICROALBCALC\", \"MALBCRT\", \"MALBEXCR\", \"ZHISFS45\", \"MICROALBUR\", \"MICRALB\", \"UMICROALBUM\", \"MICROALBTIM\"      CTA abdomen pelvis May 2022 at [facility]  Status post endovascular stenting of large fusiform aneurysm infrarenal abdominal aorta aortic stent and iliac artery  Residual aneurysm sac 7 x 6 cm slightly decreased in size from previous  No evidence of endoleak or recurrent rupture  Near complete resolution of hematoma  Stable aneurysm dilation suprarenal abdominal aorta 3.2 x 3 " cm    Ao[facility]iliac duplex 12/13/22 ([facility])      Ao[facility]iliac duplex 10/2023 ([facility])                Medical Decision Making:  Today's Assessment / Status / Plan:     1. Status post endovascular aneurysm repair (EVAR)  US-AORTA/ILIACS DUPLEX COMPLETE      2. Ruptured infrarenal abdominal aortic aneurysm (AAA) (HCC)  US-AORTA/ILIACS DUPLEX COMPLETE      3. Tobacco abuse        4. Dyslipidemia        5. Primary hypertension        6. Long term (current) use of antithrombotics/antiplatelets        7. Cigarette nicotine dependence with other nicotine-induced disorder        8. Smoking            Patient Type: Secondary Prevention    Etiology of Established CVD if Present:     1. Abdominal aortic aneurysm with rupture status post EVAR March 2022   - stable per duplex 12/2022 with residual sac from 7cm to 5.9cm   -no endoleak and EVAR appears stable on CTA 2 months after procedure.    His aneurysm sac was fairly large at presentation and there was residual suprarenal aneurysm.    18mo imaging 10/2023 with stable endograft without endo leak, residual sac 5.3cm and patent iliac stents.  Will plan to repeat in 1 year.  Due 10/2024  Again discussed the need for lifelong surveillance and the link between abdominal aortic aneurysm and risk of MI and stroke.  Pt states understating.    - continue secondary prevention med mgmt   - repeat Ao/iliac duplex annually due 10/2024 - ordered today, to be completed at [facility] in [place] (mailed to pt), then consider  Q2yr thereafter if stable - will complete at [facility]     Lipid Management:   Qualifies for Statin Therapy Based on 2018 ACC/AHA Guidelines: yes  Calculated 10-Year Risk of ASCVD: N/A  Currently on Statin: no  No lipid panel available  Again discussed the fact that AAA is a form of ASCVD and indication for statin therapy.   Despite our continued recommendation, patient declines statin therapy and declines to have a fasting lipid panel drawn d/t his intense  fear of needles.   Tx goal: LDL-C <70   At goal: n/a - unable to obtain labs per pt preference   - continue TLC measures   - revisit statin therapy at every visit - continue to declines statin despite review of benefits > risks     Blood Pressure Management:  Acc/aha (2017) Blood Pressure Goal <130/80  At goal at home? No, no checking, and high per report in other offices  At office? N/a   Again discussed, at length, his increased risk of abdominal rupture/dissection, heart attack and stroke given his untreated high blood pressure.  He refuses blood pressure medications at this time and states that he understands and accepts this risk.  We discussed that this risk can be death or potentially disabling stroke etc.  - encouraged pt and pt family to discuss BP treatment as pt's refusal of treatment potentially affects them too  - recommended for ARB and amlodipine but declines despite review of benefits > risks of meds and clear need to reduce arterial HTN to maintain stent patency and reduce risk of other CVD - pt verbalizes understanding of risks and declines therapy     Glycemic Status: Normal    Anti-Platelet/Anti-Coagulant Tx: Continue aspirin 81 mg a day    Smoking: Stages of Change: Precontemplative  (no intention to change in next 6 months)  currently smokes a little less than 1ppd   Previously discussed the fact that cigarette smoking increases the risk of need for intervention after abdominal aortic aneurysm repair.  Also increases the risk of heart attack or stroke which could decrease his ability to maintain his independence  He remains unwilling to consider quitting or cutting down at the present time  Provided strong recommendation for complete cessation and informed this is the primary contributor to the majority of all ASCVD and cancer-related conditions and can result in significant morbidity and early mortality.   - reviewed resources for cessation including tobacco cessation clinic visit, pharmacotx  meds, quit lines  - review at every visit   Provided 1 minutes of face-to-face counseling on above topics     Physical Activity: Encouraged regular physical activity.  Currently, there is no known reason why he is unable to walk, therefore, denied his request for electric scooter.  He needs to est with PCP to discuss further and needs a physical assessment, in person, in our office.    Weight Management and Nutrition: Med diet encouraged     Other:     # hx of Etoh abuse - advised for ongoing cessation efforts with complete abstinence best     Instructed to follow-up with PCP for remainder of adult medical needs: yes  We will partner with other providers in the management of established vascular disease and cardiometabolic risk factors.    Studies to Be Obtained: Ao/iliac duplex - 10/2024 ([facility]) - ordered today, rn to track  Labs to Be Obtained: refused    Follow up in:  11/2024, sooner prn to review imaging    YAN Bella.  Vascular Medicine Clinic   [facility]   [phone]

## 2024-05-24 NOTE — Clinical Note
Please print and mail order for ao/iliac to pt.  To be completed 10/2024. Also send order to [facility] in [place] as he will be completing there.

## 2024-12-03 ENCOUNTER — TELEPHONE (OUTPATIENT)
Dept: VASCULAR LAB | Facility: MEDICAL CENTER | Age: 69
End: 2024-12-03
Payer: MEDICARE

## 2024-12-03 NOTE — TELEPHONE ENCOUNTER
Per chart notes pt to have completed us aorta in October at [facility] in [place]. Will request MA send for records to review

## 2024-12-05 NOTE — TELEPHONE ENCOUNTER
Faxed request for U/S aorta imaging results completed in Oct 2024 to [facility]. (Fx#[phone])    Scanned confirmation to chart.    Rachael Bettencourt, [facility] Ass't  [facility]  Ph. [phone]  Fx. [phone]

## 2024-12-06 NOTE — TELEPHONE ENCOUNTER
Received response.  Most recent U/S was done in 10/05/2023.    Scanned to media.    Rachael Bettencourt, Med Ass't  [facility]  Ph. [phone]  Fx. [phone]

## 2024-12-09 NOTE — TELEPHONE ENCOUNTER
Called and s/w Annette PASTRANA.  They had to reschedule his U/S due to hospital staffing.  His new U/S appt is Dec 13 2024.    Will request records once completed.    Rachael Bettencourt, Med Ass't  [facility]  Ph. [phone]  Fx. [phone]

## 2024-12-19 NOTE — TELEPHONE ENCOUNTER
Faxed request for most recent U/S aorta to [facility] (fx#[phone])    Rachael Bettencourt Med Ass't  [facility]  Ph. [phone]  Fx. [phone]

## 2024-12-23 DIAGNOSIS — Z98.890 STATUS POST ENDOVASCULAR ANEURYSM REPAIR (EVAR): ICD-10-CM

## 2024-12-23 DIAGNOSIS — I71.33 RUPTURED INFRARENAL ABDOMINAL AORTIC ANEURYSM (AAA) (HCC): ICD-10-CM

## 2024-12-23 DIAGNOSIS — Z86.79 STATUS POST ENDOVASCULAR ANEURYSM REPAIR (EVAR): ICD-10-CM

## 2025-01-30 ENCOUNTER — DOCUMENTATION (OUTPATIENT)
Dept: VASCULAR LAB | Facility: MEDICAL CENTER | Age: 70
End: 2025-01-30
Payer: MEDICARE

## 2025-01-31 NOTE — PROGRESS NOTES
Imaging reviewed in accordance with institution TEVAR/EVAR guideline  This imaging performed nearly 3 years after procedure    Review of aortoliac duplex demonstrates stable endograft without endoleak. Residual Aneusrysm sac smaller than previous    Patient instructed to follow up with vascular surgeon and PCP for medical management.    Anticipate repeat imaging with aortoiliac duplex in 2 years - jan 2027 -  in accordance with institution guideline    Michael Bloch, MD  Vascular Care:    Cc: [place]

## 2025-02-04 ENCOUNTER — TELEPHONE (OUTPATIENT)
Dept: VASCULAR LAB | Facility: MEDICAL CENTER | Age: 70
End: 2025-02-04
Payer: MEDICARE

## 2025-02-04 NOTE — TELEPHONE ENCOUNTER
----- Message from Physician Michael Bloch, M.D. sent at 1/30/2025  8:37 PM PST -----  Brit - update shanta Best - let patient know that the EVAR appears stable. He overdue for f/u. Pls try to schedule with apn - virtual ok

## 2025-02-04 NOTE — TELEPHONE ENCOUNTER
Left message for patient to relay below message from Dr. Bloch. Asked patient to call back to schedule follow up.       Michael J Bloch, M.D.  Wendy Aguilar, RALEXI.; Sin Orozco, Med Ass't  Brit - update shanta Best - let patient know that the EVAR appears stable. He overdue for f/u. Pls try to schedule with apn - virtual ok          Sin Orozco, Medical Assistant   [facility]   Ph: [phone]  Fx: [phone]

## 2025-02-11 ENCOUNTER — DOCUMENTATION (OUTPATIENT)
Dept: VASCULAR LAB | Facility: MEDICAL CENTER | Age: 70
End: 2025-02-11
Payer: MEDICARE

## 2025-02-11 NOTE — PROGRESS NOTES
Left message for patient to call back and schedule f/u with APRN. Next available ok, virtual ok.           Sin Orozco, Medical Assistant   [facility]   Ph: [phone]  Fx: [phone]

## 2025-03-26 ENCOUNTER — DOCUMENTATION (OUTPATIENT)
Dept: VASCULAR LAB | Facility: MEDICAL CENTER | Age: 70
End: 2025-03-26
Payer: MEDICARE

## 2025-03-26 NOTE — PROGRESS NOTES
S/p EVAR 3/2022  Ao/iliac duplex from [facility] shows stable stent, residual sca 4.4x5.3cm - no changes/growth, no endoleaks.     MA staff - please  contact to schedule with next available APRN     Plan for repeat Ao/iliac duplex 1yr (3/2026) - RN to track

## 2025-03-28 NOTE — PROGRESS NOTES
Spoke with patients niece to schedule pt a follow up in Vascular Med. Patients niece states pt is declining follow up at this time but states that she will talk to him and let him know the importance of following up in our office. Phone number to schedule appt for our office given.           Sin Orozco, Medical Assistant   [facility]   Ph: [phone]  Fx: [phone]

## 2025-03-30 DIAGNOSIS — I71.30 RUPTURED ABDOMINAL AORTIC ANEURYSM (AAA) (HCC): ICD-10-CM

## 2025-04-01 RX ORDER — ASPIRIN 81 MG
81 TABLET,CHEWABLE ORAL DAILY
Qty: 100 TABLET | Refills: 0 | Status: SHIPPED | OUTPATIENT
Start: 2025-04-01

## 2025-07-06 DIAGNOSIS — I71.30 RUPTURED ABDOMINAL AORTIC ANEURYSM (AAA) (HCC): ICD-10-CM

## 2025-07-07 RX ORDER — ASPIRIN 81 MG
81 TABLET,CHEWABLE ORAL DAILY
Qty: 100 TABLET | Refills: 0 | OUTPATIENT
Start: 2025-07-07

## 2025-07-21 DIAGNOSIS — I71.30 RUPTURED ABDOMINAL AORTIC ANEURYSM (AAA) (HCC): ICD-10-CM

## 2025-07-22 RX ORDER — ASPIRIN 81 MG/1
81 TABLET, CHEWABLE ORAL DAILY
Qty: 30 TABLET | Refills: 0 | Status: SHIPPED | OUTPATIENT
Start: 2025-07-22

## (undated) DEVICE — GLIDEWIRE ANGLED .035 X 180 (5EA/BX)

## (undated) DEVICE — SYRINGE DISP. 12 CC LL - (100/BX)

## (undated) DEVICE — CLIP SM INTNL HRZN TI ESCP LGT - (24EA/PK 25PK/BX)

## (undated) DEVICE — CLIP MED INTNL HRZN TI ESCP - (25/BX)

## (undated) DEVICE — SUTURE 4-0 PROLENE SH 36 (36PK/BX)"

## (undated) DEVICE — TRAY MULTI-LUMEN 7FR PRESSURE W/MAX BARRIER AND BIOPATCH - (5/CA)

## (undated) DEVICE — BAG RESUSCITATION DISPOSABLE - WITH MASK (10 EA/CA)

## (undated) DEVICE — SUTURE 2-0 VICRYL PLUS CT-1 36 (36PK/BX)"

## (undated) DEVICE — PACK MAJOR BASIN - (2EA/CA)

## (undated) DEVICE — MARKER SIZING OMNIFLUSH 5F 70 - 5/BX .035 20CM SEGMENT

## (undated) DEVICE — CELLSAVER STAT

## (undated) DEVICE — FILTER BLOOD TRANSFUSION - (40/CA) (PALL)

## (undated) DEVICE — PTFE PLED STER - (250/CA)

## (undated) DEVICE — DRAPE LARGE 3 QUARTER - (20/CA)

## (undated) DEVICE — TRANSDUCER ADULT DISP. SINGLE BONDED STERILE - (20EA/CA)

## (undated) DEVICE — Device

## (undated) DEVICE — TUBING CLEARLINK DUO-VENT - C-FLO (48EA/CA)

## (undated) DEVICE — GLOVE SZ 6.5 BIOGEL PI MICRO - PF LF (50PR/BX)

## (undated) DEVICE — KIT RADIAL ARTERY 20GA W/MAX BARRIER AND BIOPATCH  (5EA/CA) #10740 IS FOR THE SET RADIAL ARTERIAL

## (undated) DEVICE — SPONGE GAUZESTER 4 X 4 4PLY - (128PK/CA)

## (undated) DEVICE — SUTURE 0 VICRYL PLUS CT-1 - 36 INCH (36/BX)

## (undated) DEVICE — SODIUM CHL. INJ. 0.9% 500ML (24EA/CA 50CA/PF)

## (undated) DEVICE — SET FLUID WARMING STANDARD FLOW - (10/CA)

## (undated) DEVICE — SLEEVE, VASO, THIGH, MED

## (undated) DEVICE — SUCTION INSTRUMENT YANKAUER BULBOUS TIP W/O VENT (50EA/CA)

## (undated) DEVICE — SHEATH DRYSEAL FLEX INTRODUCER 18FR X 28CM

## (undated) DEVICE — GLOVE BIOGEL PI INDICATOR SZ 7.0 SURGICAL PF LF - (50/BX 4BX/CA)

## (undated) DEVICE — SHEET CARDIOVASCULAR - (4/CA)

## (undated) DEVICE — VESSELOOP MAXI BLUE STERILE- SURG-I-LOOP (10EA/BX)

## (undated) DEVICE — SODIUM CHL IRRIGATION 0.9% 1000ML (12EA/CA)

## (undated) DEVICE — SHEATH DRYSEAL FLEX INTRODUCER 12FR

## (undated) DEVICE — PENCIL ELECTSURG 10FT BTN SWH - (50/CA)

## (undated) DEVICE — GOWN SURGEONS X-LARGE - DISP. (30/CA)

## (undated) DEVICE — SUTURE 4-0 PROLENE RB-1 D/A 36 (36PK/BX)"

## (undated) DEVICE — RETRACTOR O C SECTION LRY - (5/BX)

## (undated) DEVICE — MASK ANESTHESIA ADULT  - (100/CA)

## (undated) DEVICE — SUTURE CV

## (undated) DEVICE — TRAY CATHETER FOLEY URINE METER W/STATLOCK 350ML (10EA/CA)

## (undated) DEVICE — CLIP MED LG INTNL HRZN TI ESCP - (20/BX)

## (undated) DEVICE — BALLOON RELIANT

## (undated) DEVICE — DISH PETRI STERILE (50EA/CA)

## (undated) DEVICE — STOPCOCK 3-WAY W/SWIVEL LEVER LOCK (50EA/CA)

## (undated) DEVICE — SUTURE 0 SILK TIES (36PK/BX)

## (undated) DEVICE — DRAPE IOBAN II 23 IN X 33 IN - (10/BX)

## (undated) DEVICE — SURGIFOAM (SIZE 100) - (6EA/CA)

## (undated) DEVICE — SPONGE XRAY 8X4 STERL. 12PL - (10EA/TY 80TY/CA)

## (undated) DEVICE — BLADE SURGICAL #11 - (50/BX)

## (undated) DEVICE — BOVIE BLADE COATED - (50/PK)

## (undated) DEVICE — MULTI TORQUE DEVICE DISP (5EA/BX)

## (undated) DEVICE — SET INTRO MIRCROPUNCTURE - MPIS-501-SST

## (undated) DEVICE — SPONGE KITTNER DISSECTORS - (5EA/PK 50PK/CA)

## (undated) DEVICE — SPONGE PEANUT - (5/PK 50PK/CA)

## (undated) DEVICE — SYRINGE 30 ML LL (56/BX)

## (undated) DEVICE — TUBE NG SALEM SUMP 16FR (50EA/CA)

## (undated) DEVICE — SUTURE 2-0 SILK SH 24 (36PK/BX)"

## (undated) DEVICE — SPONGE RADIOPAQUE CTN X-LG - STERILE (50PK/CA) MADE TO ORDER ITEM AND HAS A 4-6 WEEK LEAD TIME

## (undated) DEVICE — SET EXTENSION WITH 2 PORTS (48EA/CA) ***PART #2C8610 IS A SUBSTITUTE*****

## (undated) DEVICE — TRAY SURESTEP FOLEY TEMP SENSING 16FR (10EA/CA) ORDER  #18764 FOR TEMP FOLEY ONLY

## (undated) DEVICE — DRESSING NON-ADHERING 8 X 3 - (50/BX)

## (undated) DEVICE — SUTURE 3-0 VICRYL PLUS SH - 8X 18 INCH (12/BX)

## (undated) DEVICE — SUTURE 3-0 SILK 12 X 18 IN - (36/BX)

## (undated) DEVICE — SET BIFURCATED BLOOD - (48EA/CS)

## (undated) DEVICE — GLOVE BIOGEL INDICATOR SZ 7SURGICAL PF LTX - (50/BX 4BX/CA)

## (undated) DEVICE — NEPTUNE 4 PORT MANIFOLD - (20/PK)

## (undated) DEVICE — CELLSAVER PACK

## (undated) DEVICE — SUTURE GENERAL

## (undated) DEVICE — GLOVE BIOGEL SZ 7 SURGICAL PF LTX - (50PR/BX 4BX/CA)

## (undated) DEVICE — STAPLER SKIN DISP - (6/BX 10BX/CA) VISISTAT

## (undated) DEVICE — PAD PREP 24 X 48 CUFFED - (100/CA)

## (undated) DEVICE — SUTURE 2-0 SILK SH (36PK/BX)

## (undated) DEVICE — BOVIE  BLADE 6 EXTENDED - (50/PK)

## (undated) DEVICE — SUTURE 1 PDS II PLUS TP-1 - (12PK/BX)

## (undated) DEVICE — PROTECTOR ULNA NERVE - (36PR/CA)

## (undated) DEVICE — SET LEADWIRE 5 LEAD BEDSIDE DISPOSABLE ECG (1SET OF 5/EA)

## (undated) DEVICE — KIT INTROPERCUTANEOUS SHEATH - 8.5 FR W/MAX BARRIER AND BIOPATCH  (5/CA)

## (undated) DEVICE — LACTATED RINGERS INJ 1000 ML - (14EA/CA 60CA/PF)

## (undated) DEVICE — TOWELS CLOTH SURGICAL - (4/PK 20PK/CA)

## (undated) DEVICE — SUTURE DEVICE CLOSURE REPAIR SYSTEM PERCLOSE PROSTYLE (10EA/BX)

## (undated) DEVICE — DRAPE MAYO STAND - (30/CA)

## (undated) DEVICE — WIRE GUIDE LUNDQST.035X180 - TSMG-35-180-4-LES ORDER BY BOX (5EA/BX)

## (undated) DEVICE — KIT ANESTHESIA W/CIRCUIT & 3/LT BAG W/FILTER (20EA/CA)

## (undated) DEVICE — SHEATH RO 6F 10CM (10EA/BX)

## (undated) DEVICE — HEAD HOLDER JUNIOR/ADULT

## (undated) DEVICE — SOD. CHL. INJ. 0.9% 1000 ML - (14EA/CA 60CA/PF)

## (undated) DEVICE — CANISTER SUCTION 3000ML MECHANICAL FILTER AUTO SHUTOFF MEDI-VAC NONSTERILE LF DISP  (40EA/CA)

## (undated) DEVICE — ELECTRODE DUAL RETURN W/ CORD - (50/PK)

## (undated) DEVICE — CLIP LG INTNL HRZN TI ESCP LGT - (20/BX)

## (undated) DEVICE — GUIDEWIRES STARTER (PTFE COATED) J CURVED FIXED CORE 0.035 180CM 10 3 MM J FS

## (undated) DEVICE — SUTURE 6-0 PROLENE BV-1 D/A 24 (36PK/BX)"

## (undated) DEVICE — DECANTER FLD BLS - (50/CA)

## (undated) DEVICE — ELECTRODE 850 FOAM ADHESIVE - HYDROGEL RADIOTRNSPRNT (50/PK)

## (undated) DEVICE — VESSELOOP MINI BLUE STERILE - SURG-I-LOOP (10EA/BX)

## (undated) DEVICE — SUTURE 1 VICRYL PLUS CTX - 36 INCH (36/BX)

## (undated) DEVICE — GLOVE BIOGEL SZ 8 SURGICAL PF LTX - (50PR/BX 4BX/CA)

## (undated) DEVICE — GLOVE BIOGEL PI ORTHO SZ 6 SURGICAL PF LF (40PR/BX)

## (undated) DEVICE — TOWEL STOP TIMEOUT SAFETY FLAG (40EA/CA)

## (undated) DEVICE — KIT SURGIFLO W/OUT THROMBIN - (6EA/CA)

## (undated) DEVICE — SENSOR SPO2 NEO LNCS ADHESIVE (20/BX) SEE USER NOTES

## (undated) DEVICE — CHLORAPREP 26 ML APPLICATOR - ORANGE TINT(25/CA)

## (undated) DEVICE — SYRINGE DISP. 60 CC LL - (30/BX, 12BX/CA)**WHEN THESE ARE GONE ORDER #500206**

## (undated) DEVICE — GELAQUASONIC 100 ULTRASOUND - 48/BX 20GM STERILE FOIL POUCH